# Patient Record
Sex: MALE | Race: BLACK OR AFRICAN AMERICAN | Employment: UNEMPLOYED | URBAN - METROPOLITAN AREA
[De-identification: names, ages, dates, MRNs, and addresses within clinical notes are randomized per-mention and may not be internally consistent; named-entity substitution may affect disease eponyms.]

---

## 2018-09-20 ENCOUNTER — TELEPHONE (OUTPATIENT)
Dept: INTERNAL MEDICINE | Age: 58
End: 2018-09-20

## 2019-12-06 ENCOUNTER — APPOINTMENT (OUTPATIENT)
Dept: GENERAL RADIOLOGY | Age: 59
DRG: 201 | End: 2019-12-06
Payer: COMMERCIAL

## 2019-12-06 ENCOUNTER — HOSPITAL ENCOUNTER (INPATIENT)
Age: 59
LOS: 5 days | Discharge: OTHER INSTITUTION | DRG: 201 | End: 2019-12-11
Attending: EMERGENCY MEDICINE | Admitting: INTERNAL MEDICINE
Payer: COMMERCIAL

## 2019-12-06 DIAGNOSIS — I48.91 ATRIAL FIBRILLATION WITH RVR (HCC): Primary | ICD-10-CM

## 2019-12-06 LAB
ALBUMIN SERPL-MCNC: 4.3 G/DL (ref 3.5–5.2)
ALP BLD-CCNC: 66 U/L (ref 40–129)
ALT SERPL-CCNC: 18 U/L (ref 0–40)
ANION GAP SERPL CALCULATED.3IONS-SCNC: 11 MMOL/L (ref 7–16)
AST SERPL-CCNC: 22 U/L (ref 0–39)
BASOPHILS ABSOLUTE: 0.02 E9/L (ref 0–0.2)
BASOPHILS RELATIVE PERCENT: 0.2 % (ref 0–2)
BILIRUB SERPL-MCNC: 1.4 MG/DL (ref 0–1.2)
BUN BLDV-MCNC: 13 MG/DL (ref 8–23)
CALCIUM SERPL-MCNC: 9.6 MG/DL (ref 8.6–10.2)
CHLORIDE BLD-SCNC: 104 MMOL/L (ref 98–107)
CO2: 29 MMOL/L (ref 22–29)
CREAT SERPL-MCNC: 1.1 MG/DL (ref 0.7–1.2)
EOSINOPHILS ABSOLUTE: 0.07 E9/L (ref 0.05–0.5)
EOSINOPHILS RELATIVE PERCENT: 0.8 % (ref 0–6)
GFR AFRICAN AMERICAN: >60
GFR NON-AFRICAN AMERICAN: >60 ML/MIN/1.73
GLUCOSE BLD-MCNC: 101 MG/DL (ref 74–99)
HCT VFR BLD CALC: 52.1 % (ref 37–54)
HEMOGLOBIN: 16.6 G/DL (ref 12.5–16.5)
IMMATURE GRANULOCYTES #: 0.03 E9/L
IMMATURE GRANULOCYTES %: 0.4 % (ref 0–5)
LYMPHOCYTES ABSOLUTE: 1.71 E9/L (ref 1.5–4)
LYMPHOCYTES RELATIVE PERCENT: 20.5 % (ref 20–42)
MAGNESIUM: 1.7 MG/DL (ref 1.6–2.6)
MCH RBC QN AUTO: 29.9 PG (ref 26–35)
MCHC RBC AUTO-ENTMCNC: 31.9 % (ref 32–34.5)
MCV RBC AUTO: 93.7 FL (ref 80–99.9)
MONOCYTES ABSOLUTE: 0.7 E9/L (ref 0.1–0.95)
MONOCYTES RELATIVE PERCENT: 8.4 % (ref 2–12)
NEUTROPHILS ABSOLUTE: 5.8 E9/L (ref 1.8–7.3)
NEUTROPHILS RELATIVE PERCENT: 69.7 % (ref 43–80)
PDW BLD-RTO: 13.7 FL (ref 11.5–15)
PLATELET # BLD: 170 E9/L (ref 130–450)
PMV BLD AUTO: 10.9 FL (ref 7–12)
POTASSIUM SERPL-SCNC: 4.5 MMOL/L (ref 3.5–5)
PRO-BNP: 987 PG/ML (ref 0–125)
RBC # BLD: 5.56 E12/L (ref 3.8–5.8)
SODIUM BLD-SCNC: 144 MMOL/L (ref 132–146)
TOTAL PROTEIN: 7.8 G/DL (ref 6.4–8.3)
TROPONIN: <0.01 NG/ML (ref 0–0.03)
WBC # BLD: 8.3 E9/L (ref 4.5–11.5)

## 2019-12-06 PROCEDURE — 83880 ASSAY OF NATRIURETIC PEPTIDE: CPT

## 2019-12-06 PROCEDURE — 6370000000 HC RX 637 (ALT 250 FOR IP): Performed by: INTERNAL MEDICINE

## 2019-12-06 PROCEDURE — 6360000002 HC RX W HCPCS: Performed by: INTERNAL MEDICINE

## 2019-12-06 PROCEDURE — 2140000000 HC CCU INTERMEDIATE R&B

## 2019-12-06 PROCEDURE — 93005 ELECTROCARDIOGRAM TRACING: CPT | Performed by: EMERGENCY MEDICINE

## 2019-12-06 PROCEDURE — 80053 COMPREHEN METABOLIC PANEL: CPT

## 2019-12-06 PROCEDURE — 71045 X-RAY EXAM CHEST 1 VIEW: CPT

## 2019-12-06 PROCEDURE — 2500000003 HC RX 250 WO HCPCS: Performed by: INTERNAL MEDICINE

## 2019-12-06 PROCEDURE — 2580000003 HC RX 258: Performed by: INTERNAL MEDICINE

## 2019-12-06 PROCEDURE — 84484 ASSAY OF TROPONIN QUANT: CPT

## 2019-12-06 PROCEDURE — 2500000003 HC RX 250 WO HCPCS: Performed by: EMERGENCY MEDICINE

## 2019-12-06 PROCEDURE — 96374 THER/PROPH/DIAG INJ IV PUSH: CPT

## 2019-12-06 PROCEDURE — 85025 COMPLETE CBC W/AUTO DIFF WBC: CPT

## 2019-12-06 PROCEDURE — 83735 ASSAY OF MAGNESIUM: CPT

## 2019-12-06 PROCEDURE — 36415 COLL VENOUS BLD VENIPUNCTURE: CPT

## 2019-12-06 PROCEDURE — 99285 EMERGENCY DEPT VISIT HI MDM: CPT

## 2019-12-06 RX ORDER — METOPROLOL TARTRATE 5 MG/5ML
5 INJECTION INTRAVENOUS ONCE
Status: COMPLETED | OUTPATIENT
Start: 2019-12-06 | End: 2019-12-06

## 2019-12-06 RX ORDER — SODIUM CHLORIDE 0.9 % (FLUSH) 0.9 %
10 SYRINGE (ML) INJECTION PRN
Status: DISCONTINUED | OUTPATIENT
Start: 2019-12-06 | End: 2019-12-11 | Stop reason: HOSPADM

## 2019-12-06 RX ORDER — ACETAMINOPHEN 325 MG/1
650 TABLET ORAL EVERY 4 HOURS PRN
Status: DISCONTINUED | OUTPATIENT
Start: 2019-12-06 | End: 2019-12-11 | Stop reason: HOSPADM

## 2019-12-06 RX ORDER — PALIPERIDONE 6 MG/1
6 TABLET, EXTENDED RELEASE ORAL EVERY MORNING
COMMUNITY

## 2019-12-06 RX ORDER — QUETIAPINE FUMARATE 50 MG/1
50 TABLET, FILM COATED ORAL NIGHTLY
COMMUNITY

## 2019-12-06 RX ORDER — DILTIAZEM HYDROCHLORIDE 5 MG/ML
25 INJECTION INTRAVENOUS ONCE
Status: COMPLETED | OUTPATIENT
Start: 2019-12-06 | End: 2019-12-06

## 2019-12-06 RX ORDER — SODIUM CHLORIDE 0.9 % (FLUSH) 0.9 %
10 SYRINGE (ML) INJECTION EVERY 12 HOURS SCHEDULED
Status: DISCONTINUED | OUTPATIENT
Start: 2019-12-06 | End: 2019-12-11 | Stop reason: HOSPADM

## 2019-12-06 RX ORDER — MELATONIN
1000 DAILY
COMMUNITY
End: 2020-09-19

## 2019-12-06 RX ORDER — MAGNESIUM SULFATE IN WATER 40 MG/ML
2 INJECTION, SOLUTION INTRAVENOUS ONCE
Status: COMPLETED | OUTPATIENT
Start: 2019-12-06 | End: 2019-12-06

## 2019-12-06 RX ORDER — M-VIT,TX,IRON,MINS/CALC/FOLIC 27MG-0.4MG
1 TABLET ORAL DAILY
COMMUNITY
End: 2020-09-19

## 2019-12-06 RX ORDER — DILTIAZEM HYDROCHLORIDE 5 MG/ML
20 INJECTION INTRAVENOUS ONCE
Status: COMPLETED | OUTPATIENT
Start: 2019-12-06 | End: 2019-12-06

## 2019-12-06 RX ORDER — POLYETHYLENE GLYCOL 3350 17 G/17G
17 POWDER, FOR SOLUTION ORAL DAILY PRN
Status: DISCONTINUED | OUTPATIENT
Start: 2019-12-06 | End: 2019-12-11 | Stop reason: HOSPADM

## 2019-12-06 RX ORDER — METOPROLOL TARTRATE 50 MG/1
100 TABLET, FILM COATED ORAL 2 TIMES DAILY
Status: DISCONTINUED | OUTPATIENT
Start: 2019-12-06 | End: 2019-12-10

## 2019-12-06 RX ADMIN — METOPROLOL TARTRATE 5 MG: 5 INJECTION INTRAVENOUS at 16:50

## 2019-12-06 RX ADMIN — METOPROLOL TARTRATE 5 MG: 5 INJECTION INTRAVENOUS at 17:11

## 2019-12-06 RX ADMIN — DILTIAZEM HYDROCHLORIDE 25 MG: 5 INJECTION INTRAVENOUS at 21:42

## 2019-12-06 RX ADMIN — MAGNESIUM SULFATE HEPTAHYDRATE 2 G: 40 INJECTION, SOLUTION INTRAVENOUS at 20:26

## 2019-12-06 RX ADMIN — Medication 10 ML: at 20:26

## 2019-12-06 RX ADMIN — METOPROLOL TARTRATE 100 MG: 50 TABLET, FILM COATED ORAL at 21:43

## 2019-12-06 RX ADMIN — DILTIAZEM HYDROCHLORIDE 20 MG: 5 INJECTION INTRAVENOUS at 20:31

## 2019-12-06 ASSESSMENT — ENCOUNTER SYMPTOMS
BACK PAIN: 0
VOMITING: 0
NAUSEA: 0
CHEST TIGHTNESS: 0
COUGH: 0
SINUS PRESSURE: 0
EYE DISCHARGE: 0
DIARRHEA: 0
SORE THROAT: 0
WHEEZING: 0
SHORTNESS OF BREATH: 0
ABDOMINAL PAIN: 0
EYE PAIN: 0
EYE REDNESS: 0

## 2019-12-06 ASSESSMENT — PAIN SCALES - GENERAL
PAINLEVEL_OUTOF10: 0
PAINLEVEL_OUTOF10: 0

## 2019-12-07 LAB
AMPHETAMINE SCREEN, URINE: NOT DETECTED
ANION GAP SERPL CALCULATED.3IONS-SCNC: 14 MMOL/L (ref 7–16)
BARBITURATE SCREEN URINE: NOT DETECTED
BASOPHILS ABSOLUTE: 0.04 E9/L (ref 0–0.2)
BASOPHILS RELATIVE PERCENT: 0.5 % (ref 0–2)
BENZODIAZEPINE SCREEN, URINE: NOT DETECTED
BILIRUBIN DIRECT: 0.2 MG/DL (ref 0–0.3)
BUN BLDV-MCNC: 14 MG/DL (ref 8–23)
CALCIUM SERPL-MCNC: 9.1 MG/DL (ref 8.6–10.2)
CANNABINOID SCREEN URINE: NOT DETECTED
CHLORIDE BLD-SCNC: 103 MMOL/L (ref 98–107)
CO2: 22 MMOL/L (ref 22–29)
COCAINE METABOLITE SCREEN URINE: NOT DETECTED
CREAT SERPL-MCNC: 1 MG/DL (ref 0.7–1.2)
EKG ATRIAL RATE: 264 BPM
EKG ATRIAL RATE: 280 BPM
EKG P AXIS: -87 DEGREES
EKG P AXIS: -89 DEGREES
EKG Q-T INTERVAL: 350 MS
EKG Q-T INTERVAL: 382 MS
EKG QRS DURATION: 78 MS
EKG QRS DURATION: 80 MS
EKG QTC CALCULATION (BAZETT): 534 MS
EKG QTC CALCULATION (BAZETT): 565 MS
EKG R AXIS: -4 DEGREES
EKG R AXIS: 59 DEGREES
EKG T AXIS: -66 DEGREES
EKG T AXIS: -66 DEGREES
EKG VENTRICULAR RATE: 132 BPM
EKG VENTRICULAR RATE: 140 BPM
EOSINOPHILS ABSOLUTE: 0.16 E9/L (ref 0.05–0.5)
EOSINOPHILS RELATIVE PERCENT: 2 % (ref 0–6)
FENTANYL SCREEN, URINE: NOT DETECTED
GFR AFRICAN AMERICAN: >60
GFR NON-AFRICAN AMERICAN: >60 ML/MIN/1.73
GLUCOSE BLD-MCNC: 87 MG/DL (ref 74–99)
HCT VFR BLD CALC: 48 % (ref 37–54)
HEMOGLOBIN: 15.4 G/DL (ref 12.5–16.5)
IMMATURE GRANULOCYTES #: 0.03 E9/L
IMMATURE GRANULOCYTES %: 0.4 % (ref 0–5)
LYMPHOCYTES ABSOLUTE: 2.31 E9/L (ref 1.5–4)
LYMPHOCYTES RELATIVE PERCENT: 29.3 % (ref 20–42)
Lab: NORMAL
MAGNESIUM: 2.1 MG/DL (ref 1.6–2.6)
MCH RBC QN AUTO: 30.5 PG (ref 26–35)
MCHC RBC AUTO-ENTMCNC: 32.1 % (ref 32–34.5)
MCV RBC AUTO: 95 FL (ref 80–99.9)
METHADONE SCREEN, URINE: NOT DETECTED
MONOCYTES ABSOLUTE: 0.76 E9/L (ref 0.1–0.95)
MONOCYTES RELATIVE PERCENT: 9.6 % (ref 2–12)
NEUTROPHILS ABSOLUTE: 4.59 E9/L (ref 1.8–7.3)
NEUTROPHILS RELATIVE PERCENT: 58.2 % (ref 43–80)
OPIATE SCREEN URINE: NOT DETECTED
OXYCODONE URINE: NOT DETECTED
PDW BLD-RTO: 13.8 FL (ref 11.5–15)
PHENCYCLIDINE SCREEN URINE: NOT DETECTED
PLATELET # BLD: 164 E9/L (ref 130–450)
PMV BLD AUTO: 11.2 FL (ref 7–12)
POTASSIUM REFLEX MAGNESIUM: 4.4 MMOL/L (ref 3.5–5)
RBC # BLD: 5.05 E12/L (ref 3.8–5.8)
SODIUM BLD-SCNC: 139 MMOL/L (ref 132–146)
T4 FREE: 1.69 NG/DL (ref 0.93–1.7)
TSH SERPL DL<=0.05 MIU/L-ACNC: 1.76 UIU/ML (ref 0.27–4.2)
WBC # BLD: 7.9 E9/L (ref 4.5–11.5)

## 2019-12-07 PROCEDURE — APPSS60 APP SPLIT SHARED TIME 46-60 MINUTES: Performed by: NURSE PRACTITIONER

## 2019-12-07 PROCEDURE — 80307 DRUG TEST PRSMV CHEM ANLYZR: CPT

## 2019-12-07 PROCEDURE — 83735 ASSAY OF MAGNESIUM: CPT

## 2019-12-07 PROCEDURE — 2580000003 HC RX 258: Performed by: INTERNAL MEDICINE

## 2019-12-07 PROCEDURE — 80048 BASIC METABOLIC PNL TOTAL CA: CPT

## 2019-12-07 PROCEDURE — 2500000003 HC RX 250 WO HCPCS: Performed by: INTERNAL MEDICINE

## 2019-12-07 PROCEDURE — 82248 BILIRUBIN DIRECT: CPT

## 2019-12-07 PROCEDURE — 84443 ASSAY THYROID STIM HORMONE: CPT

## 2019-12-07 PROCEDURE — 36415 COLL VENOUS BLD VENIPUNCTURE: CPT

## 2019-12-07 PROCEDURE — 99222 1ST HOSP IP/OBS MODERATE 55: CPT | Performed by: INTERNAL MEDICINE

## 2019-12-07 PROCEDURE — 85025 COMPLETE CBC W/AUTO DIFF WBC: CPT

## 2019-12-07 PROCEDURE — 84439 ASSAY OF FREE THYROXINE: CPT

## 2019-12-07 PROCEDURE — 93010 ELECTROCARDIOGRAM REPORT: CPT | Performed by: INTERNAL MEDICINE

## 2019-12-07 PROCEDURE — 6360000002 HC RX W HCPCS: Performed by: INTERNAL MEDICINE

## 2019-12-07 PROCEDURE — 93005 ELECTROCARDIOGRAM TRACING: CPT | Performed by: INTERNAL MEDICINE

## 2019-12-07 PROCEDURE — 6360000002 HC RX W HCPCS: Performed by: NURSE PRACTITIONER

## 2019-12-07 PROCEDURE — 99253 IP/OBS CNSLTJ NEW/EST LOW 45: CPT | Performed by: NURSE PRACTITIONER

## 2019-12-07 PROCEDURE — 2140000000 HC CCU INTERMEDIATE R&B

## 2019-12-07 PROCEDURE — 6370000000 HC RX 637 (ALT 250 FOR IP): Performed by: INTERNAL MEDICINE

## 2019-12-07 RX ORDER — 0.9 % SODIUM CHLORIDE 0.9 %
500 INTRAVENOUS SOLUTION INTRAVENOUS ONCE
Status: COMPLETED | OUTPATIENT
Start: 2019-12-07 | End: 2019-12-07

## 2019-12-07 RX ORDER — 0.9 % SODIUM CHLORIDE 0.9 %
1000 INTRAVENOUS SOLUTION INTRAVENOUS ONCE
Status: DISCONTINUED | OUTPATIENT
Start: 2019-12-07 | End: 2019-12-07

## 2019-12-07 RX ORDER — DIGOXIN 0.25 MG/ML
500 INJECTION INTRAMUSCULAR; INTRAVENOUS ONCE
Status: COMPLETED | OUTPATIENT
Start: 2019-12-07 | End: 2019-12-07

## 2019-12-07 RX ORDER — DIGOXIN 0.25 MG/ML
250 INJECTION INTRAMUSCULAR; INTRAVENOUS ONCE
Status: COMPLETED | OUTPATIENT
Start: 2019-12-07 | End: 2019-12-07

## 2019-12-07 RX ADMIN — Medication 10 ML: at 09:23

## 2019-12-07 RX ADMIN — DEXTROSE MONOHYDRATE 5 MG/HR: 50 INJECTION, SOLUTION INTRAVENOUS at 06:30

## 2019-12-07 RX ADMIN — SODIUM CHLORIDE 500 ML: 9 INJECTION, SOLUTION INTRAVENOUS at 13:46

## 2019-12-07 RX ADMIN — DEXTROSE MONOHYDRATE 5 MG/HR: 50 INJECTION, SOLUTION INTRAVENOUS at 21:19

## 2019-12-07 RX ADMIN — SODIUM CHLORIDE, PRESERVATIVE FREE 10 ML: 5 INJECTION INTRAVENOUS at 06:31

## 2019-12-07 RX ADMIN — SODIUM CHLORIDE, PRESERVATIVE FREE 10 ML: 5 INJECTION INTRAVENOUS at 09:18

## 2019-12-07 RX ADMIN — METOPROLOL TARTRATE 100 MG: 50 TABLET, FILM COATED ORAL at 20:51

## 2019-12-07 RX ADMIN — ENOXAPARIN SODIUM 40 MG: 40 INJECTION, SOLUTION INTRAVENOUS; SUBCUTANEOUS at 08:37

## 2019-12-07 RX ADMIN — METOPROLOL TARTRATE 100 MG: 50 TABLET, FILM COATED ORAL at 08:37

## 2019-12-07 RX ADMIN — DIGOXIN 250 MCG: 0.25 INJECTION INTRAMUSCULAR; INTRAVENOUS at 09:18

## 2019-12-07 RX ADMIN — DIGOXIN 500 MCG: 0.25 INJECTION INTRAMUSCULAR; INTRAVENOUS at 15:15

## 2019-12-07 ASSESSMENT — PAIN SCALES - GENERAL
PAINLEVEL_OUTOF10: 0

## 2019-12-08 ENCOUNTER — APPOINTMENT (OUTPATIENT)
Dept: GENERAL RADIOLOGY | Age: 59
DRG: 201 | End: 2019-12-08
Payer: COMMERCIAL

## 2019-12-08 LAB
ANION GAP SERPL CALCULATED.3IONS-SCNC: 13 MMOL/L (ref 7–16)
BUN BLDV-MCNC: 17 MG/DL (ref 8–23)
CALCIUM SERPL-MCNC: 9.6 MG/DL (ref 8.6–10.2)
CHLORIDE BLD-SCNC: 108 MMOL/L (ref 98–107)
CO2: 22 MMOL/L (ref 22–29)
CREAT SERPL-MCNC: 1.1 MG/DL (ref 0.7–1.2)
GFR AFRICAN AMERICAN: >60
GFR NON-AFRICAN AMERICAN: >60 ML/MIN/1.73
GLUCOSE BLD-MCNC: 80 MG/DL (ref 74–99)
MAGNESIUM: 1.9 MG/DL (ref 1.6–2.6)
POTASSIUM SERPL-SCNC: 4.6 MMOL/L (ref 3.5–5)
SODIUM BLD-SCNC: 143 MMOL/L (ref 132–146)

## 2019-12-08 PROCEDURE — 93005 ELECTROCARDIOGRAM TRACING: CPT | Performed by: INTERNAL MEDICINE

## 2019-12-08 PROCEDURE — 6360000002 HC RX W HCPCS: Performed by: INTERNAL MEDICINE

## 2019-12-08 PROCEDURE — 2580000003 HC RX 258: Performed by: INTERNAL MEDICINE

## 2019-12-08 PROCEDURE — 2140000000 HC CCU INTERMEDIATE R&B

## 2019-12-08 PROCEDURE — 80048 BASIC METABOLIC PNL TOTAL CA: CPT

## 2019-12-08 PROCEDURE — 6370000000 HC RX 637 (ALT 250 FOR IP): Performed by: INTERNAL MEDICINE

## 2019-12-08 PROCEDURE — 99232 SBSQ HOSP IP/OBS MODERATE 35: CPT | Performed by: INTERNAL MEDICINE

## 2019-12-08 PROCEDURE — 36415 COLL VENOUS BLD VENIPUNCTURE: CPT

## 2019-12-08 PROCEDURE — 83735 ASSAY OF MAGNESIUM: CPT

## 2019-12-08 RX ORDER — MAGNESIUM SULFATE IN WATER 40 MG/ML
2 INJECTION, SOLUTION INTRAVENOUS ONCE
Status: COMPLETED | OUTPATIENT
Start: 2019-12-08 | End: 2019-12-08

## 2019-12-08 RX ORDER — PALIPERIDONE 6 MG/1
6 TABLET, EXTENDED RELEASE ORAL DAILY
Status: DISCONTINUED | OUTPATIENT
Start: 2019-12-08 | End: 2019-12-11 | Stop reason: HOSPADM

## 2019-12-08 RX ADMIN — MAGNESIUM SULFATE HEPTAHYDRATE 2 G: 40 INJECTION, SOLUTION INTRAVENOUS at 16:47

## 2019-12-08 RX ADMIN — Medication 10 ML: at 22:10

## 2019-12-08 RX ADMIN — METOPROLOL TARTRATE 100 MG: 50 TABLET, FILM COATED ORAL at 11:11

## 2019-12-08 RX ADMIN — METOPROLOL TARTRATE 100 MG: 50 TABLET, FILM COATED ORAL at 22:09

## 2019-12-08 RX ADMIN — ENOXAPARIN SODIUM 90 MG: 100 INJECTION SUBCUTANEOUS at 11:12

## 2019-12-08 RX ADMIN — Medication 10 ML: at 11:12

## 2019-12-08 RX ADMIN — ENOXAPARIN SODIUM 90 MG: 100 INJECTION SUBCUTANEOUS at 22:08

## 2019-12-08 RX ADMIN — PALIPERIDONE 6 MG: 6 TABLET, EXTENDED RELEASE ORAL at 11:12

## 2019-12-08 ASSESSMENT — PAIN SCALES - GENERAL
PAINLEVEL_OUTOF10: 0

## 2019-12-09 LAB
ANION GAP SERPL CALCULATED.3IONS-SCNC: 16 MMOL/L (ref 7–16)
BASOPHILS ABSOLUTE: 0.04 E9/L (ref 0–0.2)
BASOPHILS RELATIVE PERCENT: 0.5 % (ref 0–2)
BUN BLDV-MCNC: 16 MG/DL (ref 8–23)
CALCIUM SERPL-MCNC: 9.6 MG/DL (ref 8.6–10.2)
CHLORIDE BLD-SCNC: 100 MMOL/L (ref 98–107)
CO2: 23 MMOL/L (ref 22–29)
CREAT SERPL-MCNC: 1 MG/DL (ref 0.7–1.2)
EKG ATRIAL RATE: 272 BPM
EKG ATRIAL RATE: 278 BPM
EKG P AXIS: -88 DEGREES
EKG P AXIS: -94 DEGREES
EKG Q-T INTERVAL: 356 MS
EKG Q-T INTERVAL: 374 MS
EKG QRS DURATION: 80 MS
EKG QRS DURATION: 80 MS
EKG QTC CALCULATION (BAZETT): 541 MS
EKG QTC CALCULATION (BAZETT): 562 MS
EKG R AXIS: 34 DEGREES
EKG R AXIS: 82 DEGREES
EKG T AXIS: -66 DEGREES
EKG T AXIS: -81 DEGREES
EKG VENTRICULAR RATE: 136 BPM
EKG VENTRICULAR RATE: 139 BPM
EOSINOPHILS ABSOLUTE: 0.14 E9/L (ref 0.05–0.5)
EOSINOPHILS RELATIVE PERCENT: 1.6 % (ref 0–6)
GFR AFRICAN AMERICAN: >60
GFR NON-AFRICAN AMERICAN: >60 ML/MIN/1.73
GLUCOSE BLD-MCNC: 87 MG/DL (ref 74–99)
HCT VFR BLD CALC: 50.6 % (ref 37–54)
HEMOGLOBIN: 16.2 G/DL (ref 12.5–16.5)
IMMATURE GRANULOCYTES #: 0.02 E9/L
IMMATURE GRANULOCYTES %: 0.2 % (ref 0–5)
LYMPHOCYTES ABSOLUTE: 2.45 E9/L (ref 1.5–4)
LYMPHOCYTES RELATIVE PERCENT: 28 % (ref 20–42)
MAGNESIUM: 1.9 MG/DL (ref 1.6–2.6)
MCH RBC QN AUTO: 30.3 PG (ref 26–35)
MCHC RBC AUTO-ENTMCNC: 32 % (ref 32–34.5)
MCV RBC AUTO: 94.6 FL (ref 80–99.9)
METER GLUCOSE: 103 MG/DL (ref 74–99)
METER GLUCOSE: 134 MG/DL (ref 74–99)
MONOCYTES ABSOLUTE: 0.84 E9/L (ref 0.1–0.95)
MONOCYTES RELATIVE PERCENT: 9.6 % (ref 2–12)
NEUTROPHILS ABSOLUTE: 5.26 E9/L (ref 1.8–7.3)
NEUTROPHILS RELATIVE PERCENT: 60.1 % (ref 43–80)
PDW BLD-RTO: 13.3 FL (ref 11.5–15)
PLATELET # BLD: 163 E9/L (ref 130–450)
PMV BLD AUTO: 11.2 FL (ref 7–12)
POTASSIUM SERPL-SCNC: 4.6 MMOL/L (ref 3.5–5)
RBC # BLD: 5.35 E12/L (ref 3.8–5.8)
SODIUM BLD-SCNC: 139 MMOL/L (ref 132–146)
WBC # BLD: 8.8 E9/L (ref 4.5–11.5)

## 2019-12-09 PROCEDURE — 99233 SBSQ HOSP IP/OBS HIGH 50: CPT | Performed by: INTERNAL MEDICINE

## 2019-12-09 PROCEDURE — 6370000000 HC RX 637 (ALT 250 FOR IP): Performed by: INTERNAL MEDICINE

## 2019-12-09 PROCEDURE — 80048 BASIC METABOLIC PNL TOTAL CA: CPT

## 2019-12-09 PROCEDURE — 2140000000 HC CCU INTERMEDIATE R&B

## 2019-12-09 PROCEDURE — 2580000003 HC RX 258: Performed by: INTERNAL MEDICINE

## 2019-12-09 PROCEDURE — 93010 ELECTROCARDIOGRAM REPORT: CPT | Performed by: INTERNAL MEDICINE

## 2019-12-09 PROCEDURE — 99232 SBSQ HOSP IP/OBS MODERATE 35: CPT | Performed by: INTERNAL MEDICINE

## 2019-12-09 PROCEDURE — 93005 ELECTROCARDIOGRAM TRACING: CPT | Performed by: INTERNAL MEDICINE

## 2019-12-09 PROCEDURE — 82962 GLUCOSE BLOOD TEST: CPT

## 2019-12-09 PROCEDURE — 36415 COLL VENOUS BLD VENIPUNCTURE: CPT

## 2019-12-09 PROCEDURE — 83735 ASSAY OF MAGNESIUM: CPT

## 2019-12-09 PROCEDURE — 85025 COMPLETE CBC W/AUTO DIFF WBC: CPT

## 2019-12-09 PROCEDURE — 6360000002 HC RX W HCPCS: Performed by: INTERNAL MEDICINE

## 2019-12-09 RX ORDER — MAGNESIUM SULFATE IN WATER 40 MG/ML
2 INJECTION, SOLUTION INTRAVENOUS ONCE
Status: COMPLETED | OUTPATIENT
Start: 2019-12-09 | End: 2019-12-09

## 2019-12-09 RX ADMIN — RIVAROXABAN 20 MG: 20 TABLET, FILM COATED ORAL at 18:13

## 2019-12-09 RX ADMIN — METOPROLOL TARTRATE 100 MG: 50 TABLET, FILM COATED ORAL at 21:29

## 2019-12-09 RX ADMIN — Medication 10 ML: at 10:03

## 2019-12-09 RX ADMIN — Medication 10 ML: at 21:30

## 2019-12-09 RX ADMIN — MAGNESIUM SULFATE HEPTAHYDRATE 2 G: 40 INJECTION, SOLUTION INTRAVENOUS at 10:03

## 2019-12-09 RX ADMIN — METOPROLOL TARTRATE 100 MG: 50 TABLET, FILM COATED ORAL at 10:03

## 2019-12-09 RX ADMIN — PALIPERIDONE 6 MG: 6 TABLET, EXTENDED RELEASE ORAL at 10:03

## 2019-12-09 ASSESSMENT — PAIN SCALES - GENERAL
PAINLEVEL_OUTOF10: 0

## 2019-12-10 ENCOUNTER — ANESTHESIA (OUTPATIENT)
Dept: CARDIAC CATH/INVASIVE PROCEDURES | Age: 59
DRG: 201 | End: 2019-12-10
Payer: COMMERCIAL

## 2019-12-10 ENCOUNTER — ANESTHESIA EVENT (OUTPATIENT)
Dept: CARDIAC CATH/INVASIVE PROCEDURES | Age: 59
DRG: 201 | End: 2019-12-10
Payer: COMMERCIAL

## 2019-12-10 ENCOUNTER — APPOINTMENT (OUTPATIENT)
Dept: CARDIAC CATH/INVASIVE PROCEDURES | Age: 59
End: 2019-12-10
Payer: COMMERCIAL

## 2019-12-10 VITALS
RESPIRATION RATE: 29 BRPM | DIASTOLIC BLOOD PRESSURE: 82 MMHG | SYSTOLIC BLOOD PRESSURE: 114 MMHG | OXYGEN SATURATION: 95 %

## 2019-12-10 LAB
ANION GAP SERPL CALCULATED.3IONS-SCNC: 14 MMOL/L (ref 7–16)
BUN BLDV-MCNC: 17 MG/DL (ref 8–23)
CALCIUM SERPL-MCNC: 9.5 MG/DL (ref 8.6–10.2)
CHLORIDE BLD-SCNC: 104 MMOL/L (ref 98–107)
CO2: 22 MMOL/L (ref 22–29)
CREAT SERPL-MCNC: 1 MG/DL (ref 0.7–1.2)
EKG ATRIAL RATE: 71 BPM
EKG P AXIS: 72 DEGREES
EKG P-R INTERVAL: 140 MS
EKG Q-T INTERVAL: 384 MS
EKG QRS DURATION: 84 MS
EKG QTC CALCULATION (BAZETT): 417 MS
EKG R AXIS: 53 DEGREES
EKG T AXIS: 57 DEGREES
EKG VENTRICULAR RATE: 71 BPM
GFR AFRICAN AMERICAN: >60
GFR NON-AFRICAN AMERICAN: >60 ML/MIN/1.73
GLUCOSE BLD-MCNC: 89 MG/DL (ref 74–99)
LV EF: 23 %
LVEF MODALITY: NORMAL
MAGNESIUM: 1.8 MG/DL (ref 1.6–2.6)
METER GLUCOSE: 102 MG/DL (ref 74–99)
METER GLUCOSE: 111 MG/DL (ref 74–99)
POTASSIUM SERPL-SCNC: 4.5 MMOL/L (ref 3.5–5)
SODIUM BLD-SCNC: 140 MMOL/L (ref 132–146)

## 2019-12-10 PROCEDURE — 2709999900 HC NON-CHARGEABLE SUPPLY

## 2019-12-10 PROCEDURE — 99233 SBSQ HOSP IP/OBS HIGH 50: CPT | Performed by: INTERNAL MEDICINE

## 2019-12-10 PROCEDURE — 36415 COLL VENOUS BLD VENIPUNCTURE: CPT

## 2019-12-10 PROCEDURE — 93005 ELECTROCARDIOGRAM TRACING: CPT | Performed by: INTERNAL MEDICINE

## 2019-12-10 PROCEDURE — 92960 CARDIOVERSION ELECTRIC EXT: CPT | Performed by: INTERNAL MEDICINE

## 2019-12-10 PROCEDURE — 93010 ELECTROCARDIOGRAM REPORT: CPT | Performed by: INTERNAL MEDICINE

## 2019-12-10 PROCEDURE — 6360000002 HC RX W HCPCS: Performed by: INTERNAL MEDICINE

## 2019-12-10 PROCEDURE — 6360000002 HC RX W HCPCS: Performed by: NURSE ANESTHETIST, CERTIFIED REGISTERED

## 2019-12-10 PROCEDURE — 2140000000 HC CCU INTERMEDIATE R&B

## 2019-12-10 PROCEDURE — 2580000003 HC RX 258: Performed by: NURSE ANESTHETIST, CERTIFIED REGISTERED

## 2019-12-10 PROCEDURE — 3700000001 HC ADD 15 MINUTES (ANESTHESIA)

## 2019-12-10 PROCEDURE — B24BZZ4 ULTRASONOGRAPHY OF HEART WITH AORTA, TRANSESOPHAGEAL: ICD-10-PCS | Performed by: INTERNAL MEDICINE

## 2019-12-10 PROCEDURE — 80048 BASIC METABOLIC PNL TOTAL CA: CPT

## 2019-12-10 PROCEDURE — 2580000003 HC RX 258: Performed by: INTERNAL MEDICINE

## 2019-12-10 PROCEDURE — 93312 ECHO TRANSESOPHAGEAL: CPT

## 2019-12-10 PROCEDURE — 5A2204Z RESTORATION OF CARDIAC RHYTHM, SINGLE: ICD-10-PCS | Performed by: INTERNAL MEDICINE

## 2019-12-10 PROCEDURE — 99232 SBSQ HOSP IP/OBS MODERATE 35: CPT | Performed by: INTERNAL MEDICINE

## 2019-12-10 PROCEDURE — 6370000000 HC RX 637 (ALT 250 FOR IP): Performed by: INTERNAL MEDICINE

## 2019-12-10 PROCEDURE — 3700000000 HC ANESTHESIA ATTENDED CARE

## 2019-12-10 PROCEDURE — 93325 DOPPLER ECHO COLOR FLOW MAPG: CPT

## 2019-12-10 PROCEDURE — 82962 GLUCOSE BLOOD TEST: CPT

## 2019-12-10 PROCEDURE — 83735 ASSAY OF MAGNESIUM: CPT

## 2019-12-10 PROCEDURE — 93321 DOPPLER ECHO F-UP/LMTD STD: CPT

## 2019-12-10 RX ORDER — METOPROLOL SUCCINATE 50 MG/1
50 TABLET, EXTENDED RELEASE ORAL 2 TIMES DAILY
Status: DISCONTINUED | OUTPATIENT
Start: 2019-12-10 | End: 2019-12-11 | Stop reason: HOSPADM

## 2019-12-10 RX ORDER — PROPOFOL 10 MG/ML
INJECTION, EMULSION INTRAVENOUS PRN
Status: DISCONTINUED | OUTPATIENT
Start: 2019-12-10 | End: 2019-12-10 | Stop reason: SDUPTHER

## 2019-12-10 RX ORDER — SODIUM CHLORIDE 9 MG/ML
INJECTION, SOLUTION INTRAVENOUS CONTINUOUS
Status: DISCONTINUED | OUTPATIENT
Start: 2019-12-10 | End: 2019-12-10

## 2019-12-10 RX ORDER — MAGNESIUM SULFATE IN WATER 40 MG/ML
2 INJECTION, SOLUTION INTRAVENOUS ONCE
Status: COMPLETED | OUTPATIENT
Start: 2019-12-10 | End: 2019-12-10

## 2019-12-10 RX ORDER — SODIUM CHLORIDE 9 MG/ML
INJECTION, SOLUTION INTRAVENOUS CONTINUOUS PRN
Status: DISCONTINUED | OUTPATIENT
Start: 2019-12-10 | End: 2019-12-10 | Stop reason: SDUPTHER

## 2019-12-10 RX ADMIN — Medication 10 ML: at 10:34

## 2019-12-10 RX ADMIN — SODIUM CHLORIDE: 9 INJECTION, SOLUTION INTRAVENOUS at 08:34

## 2019-12-10 RX ADMIN — MAGNESIUM SULFATE HEPTAHYDRATE 2 G: 40 INJECTION, SOLUTION INTRAVENOUS at 09:05

## 2019-12-10 RX ADMIN — METOPROLOL SUCCINATE 50 MG: 50 TABLET, EXTENDED RELEASE ORAL at 10:35

## 2019-12-10 RX ADMIN — METOPROLOL SUCCINATE 50 MG: 50 TABLET, EXTENDED RELEASE ORAL at 21:03

## 2019-12-10 RX ADMIN — PROPOFOL 180 MG: 10 INJECTION, EMULSION INTRAVENOUS at 08:37

## 2019-12-10 RX ADMIN — RIVAROXABAN 20 MG: 20 TABLET, FILM COATED ORAL at 18:57

## 2019-12-10 RX ADMIN — PALIPERIDONE 6 MG: 6 TABLET, EXTENDED RELEASE ORAL at 10:34

## 2019-12-10 RX ADMIN — Medication 10 ML: at 21:04

## 2019-12-10 ASSESSMENT — PAIN SCALES - GENERAL
PAINLEVEL_OUTOF10: 0

## 2019-12-11 VITALS
HEART RATE: 65 BPM | HEIGHT: 73 IN | DIASTOLIC BLOOD PRESSURE: 62 MMHG | WEIGHT: 190.4 LBS | BODY MASS INDEX: 25.23 KG/M2 | OXYGEN SATURATION: 98 % | RESPIRATION RATE: 18 BRPM | TEMPERATURE: 98.5 F | SYSTOLIC BLOOD PRESSURE: 108 MMHG

## 2019-12-11 LAB
ANION GAP SERPL CALCULATED.3IONS-SCNC: 14 MMOL/L (ref 7–16)
BUN BLDV-MCNC: 25 MG/DL (ref 8–23)
CALCIUM SERPL-MCNC: 9.4 MG/DL (ref 8.6–10.2)
CHLORIDE BLD-SCNC: 101 MMOL/L (ref 98–107)
CO2: 24 MMOL/L (ref 22–29)
CREAT SERPL-MCNC: 1.1 MG/DL (ref 0.7–1.2)
GFR AFRICAN AMERICAN: >60
GFR NON-AFRICAN AMERICAN: >60 ML/MIN/1.73
GLUCOSE BLD-MCNC: 96 MG/DL (ref 74–99)
MAGNESIUM: 1.8 MG/DL (ref 1.6–2.6)
METER GLUCOSE: 79 MG/DL (ref 74–99)
METER GLUCOSE: 93 MG/DL (ref 74–99)
POTASSIUM SERPL-SCNC: 4.5 MMOL/L (ref 3.5–5)
SODIUM BLD-SCNC: 139 MMOL/L (ref 132–146)

## 2019-12-11 PROCEDURE — 80048 BASIC METABOLIC PNL TOTAL CA: CPT

## 2019-12-11 PROCEDURE — 82962 GLUCOSE BLOOD TEST: CPT

## 2019-12-11 PROCEDURE — 6360000002 HC RX W HCPCS: Performed by: INTERNAL MEDICINE

## 2019-12-11 PROCEDURE — 2580000003 HC RX 258: Performed by: INTERNAL MEDICINE

## 2019-12-11 PROCEDURE — 36415 COLL VENOUS BLD VENIPUNCTURE: CPT

## 2019-12-11 PROCEDURE — 6370000000 HC RX 637 (ALT 250 FOR IP): Performed by: INTERNAL MEDICINE

## 2019-12-11 PROCEDURE — 99233 SBSQ HOSP IP/OBS HIGH 50: CPT | Performed by: INTERNAL MEDICINE

## 2019-12-11 PROCEDURE — 83735 ASSAY OF MAGNESIUM: CPT

## 2019-12-11 PROCEDURE — 99232 SBSQ HOSP IP/OBS MODERATE 35: CPT | Performed by: INTERNAL MEDICINE

## 2019-12-11 RX ORDER — MAGNESIUM SULFATE IN WATER 40 MG/ML
2 INJECTION, SOLUTION INTRAVENOUS ONCE
Status: COMPLETED | OUTPATIENT
Start: 2019-12-11 | End: 2019-12-11

## 2019-12-11 RX ORDER — LISINOPRIL 2.5 MG/1
2.5 TABLET ORAL DAILY
Qty: 30 TABLET | Refills: 3 | Status: SHIPPED | OUTPATIENT
Start: 2019-12-12

## 2019-12-11 RX ORDER — METOPROLOL SUCCINATE 50 MG/1
50 TABLET, EXTENDED RELEASE ORAL 2 TIMES DAILY
Qty: 30 TABLET | Refills: 3 | Status: SHIPPED | OUTPATIENT
Start: 2019-12-11 | End: 2020-10-02 | Stop reason: SDUPTHER

## 2019-12-11 RX ORDER — LISINOPRIL 5 MG/1
2.5 TABLET ORAL DAILY
Status: DISCONTINUED | OUTPATIENT
Start: 2019-12-11 | End: 2019-12-11 | Stop reason: HOSPADM

## 2019-12-11 RX ADMIN — MAGNESIUM SULFATE HEPTAHYDRATE 2 G: 40 INJECTION, SOLUTION INTRAVENOUS at 10:32

## 2019-12-11 RX ADMIN — PALIPERIDONE 6 MG: 6 TABLET, EXTENDED RELEASE ORAL at 08:21

## 2019-12-11 RX ADMIN — SODIUM CHLORIDE, PRESERVATIVE FREE 10 ML: 5 INJECTION INTRAVENOUS at 10:33

## 2019-12-11 RX ADMIN — METOPROLOL SUCCINATE 50 MG: 50 TABLET, EXTENDED RELEASE ORAL at 08:20

## 2019-12-11 RX ADMIN — Medication 10 ML: at 08:21

## 2019-12-11 RX ADMIN — LISINOPRIL 2.5 MG: 5 TABLET ORAL at 08:20

## 2019-12-11 ASSESSMENT — PAIN SCALES - GENERAL: PAINLEVEL_OUTOF10: 0

## 2020-01-16 ENCOUNTER — TELEPHONE (OUTPATIENT)
Dept: CARDIOLOGY CLINIC | Age: 60
End: 2020-01-16

## 2020-01-16 NOTE — TELEPHONE ENCOUNTER
Nate Bustos with [address] brought patient into the office today for an old appointment originally requested by Dr Meghann Guerrero. He was scheduled to see you @ 10:00 today as a new patient    The consultation appointment was cancelled by [facility] on 12/11/2019. Gautam Mcdanielas his care giver was not notified that this appointment was cancelled. Patient was hospitalized 12/6/2019 discharged 12/11/2019     Discharge Diagnosis:  1. A flutter with RVR - resolved   2. Tachycardia induced cardiomyopathy   3. Schizophrenia     Dr Sandra Marrufo saw him consult 12/7/2019, he underwent KADEEM/cardioversion by Dr Leslie Silver on 12/10/2020     Does patient need seen in our office in follow up? May I schedule him to see you? Scheduled an appointment. cpa

## 2020-09-19 ENCOUNTER — HOSPITAL ENCOUNTER (INPATIENT)
Age: 60
LOS: 4 days | Discharge: HOME | DRG: 201 | End: 2020-09-23
Attending: EMERGENCY MEDICINE | Admitting: INTERNAL MEDICINE
Payer: COMMERCIAL

## 2020-09-19 ENCOUNTER — APPOINTMENT (OUTPATIENT)
Dept: GENERAL RADIOLOGY | Age: 60
DRG: 201 | End: 2020-09-19
Payer: COMMERCIAL

## 2020-09-19 PROBLEM — I47.1 SVT (SUPRAVENTRICULAR TACHYCARDIA) (HCC): Status: ACTIVE | Noted: 2020-09-19

## 2020-09-19 LAB
ALBUMIN SERPL-MCNC: 4 G/DL (ref 3.5–5.2)
ALP BLD-CCNC: 53 U/L (ref 40–129)
ALT SERPL-CCNC: 18 U/L (ref 0–40)
ANION GAP SERPL CALCULATED.3IONS-SCNC: 14 MMOL/L (ref 7–16)
AST SERPL-CCNC: 22 U/L (ref 0–39)
BASOPHILS ABSOLUTE: 0.02 E9/L (ref 0–0.2)
BASOPHILS RELATIVE PERCENT: 0.3 % (ref 0–2)
BILIRUB SERPL-MCNC: 0.4 MG/DL (ref 0–1.2)
BUN BLDV-MCNC: 11 MG/DL (ref 8–23)
CALCIUM SERPL-MCNC: 9.1 MG/DL (ref 8.6–10.2)
CHLORIDE BLD-SCNC: 105 MMOL/L (ref 98–107)
CO2: 23 MMOL/L (ref 22–29)
CREAT SERPL-MCNC: 1 MG/DL (ref 0.7–1.2)
EOSINOPHILS ABSOLUTE: 0.07 E9/L (ref 0.05–0.5)
EOSINOPHILS RELATIVE PERCENT: 1 % (ref 0–6)
GFR AFRICAN AMERICAN: >60
GFR NON-AFRICAN AMERICAN: >60 ML/MIN/1.73
GLUCOSE BLD-MCNC: 109 MG/DL (ref 74–99)
HCT VFR BLD CALC: 39.9 % (ref 37–54)
HEMOGLOBIN: 12.7 G/DL (ref 12.5–16.5)
IMMATURE GRANULOCYTES #: 0.02 E9/L
IMMATURE GRANULOCYTES %: 0.3 % (ref 0–5)
LYMPHOCYTES ABSOLUTE: 1.24 E9/L (ref 1.5–4)
LYMPHOCYTES RELATIVE PERCENT: 16.9 % (ref 20–42)
MAGNESIUM: 1.8 MG/DL (ref 1.6–2.6)
MCH RBC QN AUTO: 28.9 PG (ref 26–35)
MCHC RBC AUTO-ENTMCNC: 31.8 % (ref 32–34.5)
MCV RBC AUTO: 90.9 FL (ref 80–99.9)
MONOCYTES ABSOLUTE: 0.68 E9/L (ref 0.1–0.95)
MONOCYTES RELATIVE PERCENT: 9.3 % (ref 2–12)
NEUTROPHILS ABSOLUTE: 5.3 E9/L (ref 1.8–7.3)
NEUTROPHILS RELATIVE PERCENT: 72.2 % (ref 43–80)
PDW BLD-RTO: 13 FL (ref 11.5–15)
PLATELET # BLD: 153 E9/L (ref 130–450)
PMV BLD AUTO: 10.3 FL (ref 7–12)
POTASSIUM SERPL-SCNC: 4.5 MMOL/L (ref 3.5–5)
PRO-BNP: 588 PG/ML (ref 0–125)
RBC # BLD: 4.39 E12/L (ref 3.8–5.8)
SODIUM BLD-SCNC: 142 MMOL/L (ref 132–146)
T4 FREE: 1.28 NG/DL (ref 0.93–1.7)
TOTAL PROTEIN: 7.3 G/DL (ref 6.4–8.3)
TROPONIN: <0.01 NG/ML (ref 0–0.03)
TSH SERPL DL<=0.05 MIU/L-ACNC: 1.46 UIU/ML (ref 0.27–4.2)
WBC # BLD: 7.3 E9/L (ref 4.5–11.5)

## 2020-09-19 PROCEDURE — 2140000000 HC CCU INTERMEDIATE R&B

## 2020-09-19 PROCEDURE — 99285 EMERGENCY DEPT VISIT HI MDM: CPT

## 2020-09-19 PROCEDURE — 83880 ASSAY OF NATRIURETIC PEPTIDE: CPT

## 2020-09-19 PROCEDURE — 83735 ASSAY OF MAGNESIUM: CPT

## 2020-09-19 PROCEDURE — 96376 TX/PRO/DX INJ SAME DRUG ADON: CPT

## 2020-09-19 PROCEDURE — 80053 COMPREHEN METABOLIC PANEL: CPT

## 2020-09-19 PROCEDURE — 96367 TX/PROPH/DG ADDL SEQ IV INF: CPT

## 2020-09-19 PROCEDURE — 6370000000 HC RX 637 (ALT 250 FOR IP): Performed by: INTERNAL MEDICINE

## 2020-09-19 PROCEDURE — 96366 THER/PROPH/DIAG IV INF ADDON: CPT

## 2020-09-19 PROCEDURE — 6360000002 HC RX W HCPCS: Performed by: EMERGENCY MEDICINE

## 2020-09-19 PROCEDURE — 96365 THER/PROPH/DIAG IV INF INIT: CPT

## 2020-09-19 PROCEDURE — 85025 COMPLETE CBC W/AUTO DIFF WBC: CPT

## 2020-09-19 PROCEDURE — 84443 ASSAY THYROID STIM HORMONE: CPT

## 2020-09-19 PROCEDURE — 71045 X-RAY EXAM CHEST 1 VIEW: CPT

## 2020-09-19 PROCEDURE — 84484 ASSAY OF TROPONIN QUANT: CPT

## 2020-09-19 PROCEDURE — 93005 ELECTROCARDIOGRAM TRACING: CPT | Performed by: STUDENT IN AN ORGANIZED HEALTH CARE EDUCATION/TRAINING PROGRAM

## 2020-09-19 PROCEDURE — 2500000003 HC RX 250 WO HCPCS: Performed by: STUDENT IN AN ORGANIZED HEALTH CARE EDUCATION/TRAINING PROGRAM

## 2020-09-19 PROCEDURE — 93005 ELECTROCARDIOGRAM TRACING: CPT | Performed by: EMERGENCY MEDICINE

## 2020-09-19 PROCEDURE — 84439 ASSAY OF FREE THYROXINE: CPT

## 2020-09-19 RX ORDER — QUETIAPINE FUMARATE 25 MG/1
50 TABLET, FILM COATED ORAL NIGHTLY
Status: DISCONTINUED | OUTPATIENT
Start: 2020-09-19 | End: 2020-09-23 | Stop reason: HOSPADM

## 2020-09-19 RX ORDER — IBUPROFEN 800 MG/1
800 TABLET ORAL EVERY 8 HOURS PRN
COMMUNITY

## 2020-09-19 RX ORDER — POLYETHYLENE GLYCOL 3350 17 G/17G
17 POWDER, FOR SOLUTION ORAL DAILY PRN
Status: DISCONTINUED | OUTPATIENT
Start: 2020-09-19 | End: 2020-09-23 | Stop reason: HOSPADM

## 2020-09-19 RX ORDER — LIDOCAINE HYDROCHLORIDE 20 MG/ML
INJECTION, SOLUTION INTRAVENOUS DAILY PRN
Status: COMPLETED | OUTPATIENT
Start: 2020-09-19 | End: 2020-09-19

## 2020-09-19 RX ORDER — SODIUM CHLORIDE 0.9 % (FLUSH) 0.9 %
10 SYRINGE (ML) INJECTION EVERY 12 HOURS SCHEDULED
Status: DISCONTINUED | OUTPATIENT
Start: 2020-09-19 | End: 2020-09-23 | Stop reason: HOSPADM

## 2020-09-19 RX ORDER — PROMETHAZINE HYDROCHLORIDE 25 MG/1
12.5 TABLET ORAL EVERY 6 HOURS PRN
Status: DISCONTINUED | OUTPATIENT
Start: 2020-09-19 | End: 2020-09-23 | Stop reason: HOSPADM

## 2020-09-19 RX ORDER — ONDANSETRON 2 MG/ML
4 INJECTION INTRAMUSCULAR; INTRAVENOUS EVERY 6 HOURS PRN
Status: DISCONTINUED | OUTPATIENT
Start: 2020-09-19 | End: 2020-09-23 | Stop reason: HOSPADM

## 2020-09-19 RX ORDER — PALIPERIDONE 6 MG/1
6 TABLET, EXTENDED RELEASE ORAL EVERY MORNING
Status: DISCONTINUED | OUTPATIENT
Start: 2020-09-20 | End: 2020-09-23 | Stop reason: HOSPADM

## 2020-09-19 RX ORDER — ACETAMINOPHEN 325 MG/1
650 TABLET ORAL EVERY 6 HOURS PRN
Status: DISCONTINUED | OUTPATIENT
Start: 2020-09-19 | End: 2020-09-23 | Stop reason: HOSPADM

## 2020-09-19 RX ORDER — SODIUM CHLORIDE 0.9 % (FLUSH) 0.9 %
10 SYRINGE (ML) INJECTION PRN
Status: DISCONTINUED | OUTPATIENT
Start: 2020-09-19 | End: 2020-09-23 | Stop reason: HOSPADM

## 2020-09-19 RX ORDER — LIDOCAINE HYDROCHLORIDE ANHYDROUS AND DEXTROSE MONOHYDRATE .4; 5 G/100ML; G/100ML
INJECTION, SOLUTION INTRAVENOUS
Status: DISCONTINUED
Start: 2020-09-19 | End: 2020-09-19

## 2020-09-19 RX ORDER — LIDOCAINE HYDROCHLORIDE 20 MG/ML
INJECTION, SOLUTION INTRAVENOUS
Status: DISCONTINUED
Start: 2020-09-19 | End: 2020-09-19

## 2020-09-19 RX ORDER — ACETAMINOPHEN 650 MG/1
650 SUPPOSITORY RECTAL EVERY 6 HOURS PRN
Status: DISCONTINUED | OUTPATIENT
Start: 2020-09-19 | End: 2020-09-23 | Stop reason: HOSPADM

## 2020-09-19 RX ORDER — MULTIVIT-MIN/FA/LYCOPEN/LUTEIN .4-300-25
1 TABLET ORAL EVERY MORNING
COMMUNITY

## 2020-09-19 RX ORDER — DILTIAZEM HYDROCHLORIDE 5 MG/ML
10 INJECTION INTRAVENOUS ONCE
Status: COMPLETED | OUTPATIENT
Start: 2020-09-19 | End: 2020-09-19

## 2020-09-19 RX ADMIN — LIDOCAINE HYDROCHLORIDE 100 MG: 20 INJECTION, SOLUTION INTRAVENOUS at 16:34

## 2020-09-19 RX ADMIN — DEXTROSE MONOHYDRATE 5 MG/HR: 50 INJECTION, SOLUTION INTRAVENOUS at 17:11

## 2020-09-19 RX ADMIN — DILTIAZEM HYDROCHLORIDE 10 MG: 5 INJECTION INTRAVENOUS at 17:04

## 2020-09-19 RX ADMIN — QUETIAPINE FUMARATE 50 MG: 25 TABLET ORAL at 22:07

## 2020-09-19 RX ADMIN — RIVAROXABAN 20 MG: 20 TABLET, FILM COATED ORAL at 22:07

## 2020-09-19 ASSESSMENT — ENCOUNTER SYMPTOMS
HEMOPTYSIS: 0
CHEST TIGHTNESS: 0
RHINORRHEA: 0
NAUSEA: 0
SHORTNESS OF BREATH: 1
DIARRHEA: 0
COUGH: 0
ABDOMINAL PAIN: 0
SORE THROAT: 0
BACK PAIN: 0
CONSTIPATION: 0
WHEEZING: 0
VOMITING: 0
PHOTOPHOBIA: 0

## 2020-09-19 ASSESSMENT — PAIN SCALES - GENERAL: PAINLEVEL_OUTOF10: 0

## 2020-09-19 NOTE — ED NOTES
Bed: 10  Expected date: 9/19/20  Expected time:   Means of arrival: [facility]  Comments:  ALFA Browne, THERESA  09/19/20 0285

## 2020-09-19 NOTE — ED PROVIDER NOTES
breath. Negative for cough, hemoptysis, chest tightness and wheezing. Cardiovascular: Positive for palpitations and near-syncope. Negative for chest pain and leg swelling. Gastrointestinal: Negative for abdominal pain, constipation, diarrhea, nausea and vomiting. Genitourinary: Negative for decreased urine volume, dysuria, flank pain, frequency and urgency. Musculoskeletal: Negative for back pain and myalgias. Skin: Negative for pallor and rash. Neurological: Positive for dizziness, weakness and light-headedness. Negative for syncope, facial asymmetry, speech difficulty and numbness. Physical Exam  Vitals signs and nursing note reviewed. Constitutional:       General: He is not in acute distress. Appearance: Normal appearance. He is obese. He is not ill-appearing or diaphoretic. HENT:      Head: Normocephalic and atraumatic. Mouth/Throat:      Mouth: Mucous membranes are moist.   Eyes:      Pupils: Pupils are equal, round, and reactive to light. Cardiovascular:      Rate and Rhythm: Regular rhythm. Tachycardia present. Pulses: Normal pulses. Heart sounds: Normal heart sounds. Pulmonary:      Effort: Pulmonary effort is normal. No respiratory distress. Breath sounds: Normal breath sounds. No wheezing or rhonchi. Chest:      Chest wall: No tenderness. Abdominal:      General: Abdomen is flat. Palpations: Abdomen is soft. Tenderness: There is no abdominal tenderness. There is no guarding or rebound. Musculoskeletal:         General: No swelling or tenderness. Skin:     General: Skin is warm and dry. Neurological:      Mental Status: He is alert and oriented to person, place, and time.           Procedures     MDM  Number of Diagnoses or Management Options  Atrial fibrillation with RVR [facility]):   Near syncope:   Paroxysmal supraventricular tachycardia [facility]):   Diagnosis management comments: Patient is a 75-year-old male with history of atrial branch block. Comparison: changes compared to previous EKG    This EKG is signed and interpreted by me. Rate: 127  Rhythm: Atrial flutter with variable AV block and diffuse ST depression. Interpretation: Atrial flutter with variable AV block and diffuse ST depression. Comparison: changes compared to previous EKG      ------------------------- NURSING NOTES AND VITALS REVIEWED ---------------------------  Date / Time Roomed:  9/19/2020  4:28 PM  ED Bed Assignment:  [id number]    The nursing notes within the ED encounter and vital signs as below have been reviewed. Patient Vitals for the past 24 hrs:   BP Temp Temp src Pulse Resp SpO2 Height Weight   09/20/20 0000 (!) 108/55 98.3 °F (36.8 °C) Oral 66 16 98 % -- --   09/19/20 2100 138/81 97 °F (36.1 °C) Temporal 98 18 98 % 6' 2\" (1.88 m) 207 lb 6 oz (94.1 kg)   09/19/20 2019 (!) 116/90 97.6 °F (36.4 °C) -- 103 16 99 % -- --   09/19/20 1933 107/87 -- -- 115 18 99 % -- --   09/19/20 1914 (!) 107/91 -- -- 112 20 99 % -- --   09/19/20 1845 120/80 -- -- 127 16 98 % -- --   09/19/20 1826 123/73 -- -- 136 16 99 % -- --   09/19/20 1741 (!) 123/90 -- -- 128 16 98 % -- --   09/19/20 1712 (!) 123/92 -- -- 122 -- -- -- --   09/19/20 1711 -- -- -- 132 16 100 % -- --   09/19/20 1704 (!) 126/94 -- -- 130 16 99 % -- --   09/19/20 1643 (!) 130/99 -- -- 127 16 99 % -- --   09/19/20 1636 (!) 144/60 -- -- 129 -- -- -- --   09/19/20 1636 132/60 -- -- 174 -- -- -- --   09/19/20 1632 -- 97.6 °F (36.4 °C) Tympanic (!) 258 20 96 % -- 190 lb (86.2 kg)       Oxygen Saturation Interpretation: Normal      ------------------------------------------ PROGRESS NOTES ------------------------------------------  Re-evaluation(s):  Time: 1640. Patients symptoms show no change  Repeat physical examination is not changed    Time: 1730.   Patients symptoms are improving  Repeat physical examination is not changed    I have spoken with the patient and discussed todays results, in addition to

## 2020-09-20 LAB
ANION GAP SERPL CALCULATED.3IONS-SCNC: 14 MMOL/L (ref 7–16)
BASOPHILS ABSOLUTE: 0.07 E9/L (ref 0–0.2)
BASOPHILS RELATIVE PERCENT: 1 % (ref 0–2)
BUN BLDV-MCNC: 15 MG/DL (ref 8–23)
CALCIUM SERPL-MCNC: 9.2 MG/DL (ref 8.6–10.2)
CHLORIDE BLD-SCNC: 105 MMOL/L (ref 98–107)
CO2: 23 MMOL/L (ref 22–29)
CREAT SERPL-MCNC: 1.1 MG/DL (ref 0.7–1.2)
EKG ATRIAL RATE: 227 BPM
EKG ATRIAL RATE: 241 BPM
EKG P AXIS: -81 DEGREES
EKG Q-T INTERVAL: 164 MS
EKG Q-T INTERVAL: 294 MS
EKG QRS DURATION: 166 MS
EKG QRS DURATION: 84 MS
EKG QTC CALCULATION (BAZETT): 340 MS
EKG QTC CALCULATION (BAZETT): 415 MS
EKG R AXIS: -168 DEGREES
EKG R AXIS: 67 DEGREES
EKG T AXIS: 0 DEGREES
EKG T AXIS: 47 DEGREES
EKG VENTRICULAR RATE: 120 BPM
EKG VENTRICULAR RATE: 258 BPM
EOSINOPHILS ABSOLUTE: 0.11 E9/L (ref 0.05–0.5)
EOSINOPHILS RELATIVE PERCENT: 1.6 % (ref 0–6)
GFR AFRICAN AMERICAN: >60
GFR NON-AFRICAN AMERICAN: >60 ML/MIN/1.73
GLUCOSE BLD-MCNC: 95 MG/DL (ref 74–99)
HCT VFR BLD CALC: 40.8 % (ref 37–54)
HEMOGLOBIN: 13.1 G/DL (ref 12.5–16.5)
IMMATURE GRANULOCYTES #: 0.02 E9/L
IMMATURE GRANULOCYTES %: 0.3 % (ref 0–5)
INR BLD: 2.1
LYMPHOCYTES ABSOLUTE: 2 E9/L (ref 1.5–4)
LYMPHOCYTES RELATIVE PERCENT: 28.4 % (ref 20–42)
MAGNESIUM: 2 MG/DL (ref 1.6–2.6)
MCH RBC QN AUTO: 28.9 PG (ref 26–35)
MCHC RBC AUTO-ENTMCNC: 32.1 % (ref 32–34.5)
MCV RBC AUTO: 89.9 FL (ref 80–99.9)
MONOCYTES ABSOLUTE: 0.77 E9/L (ref 0.1–0.95)
MONOCYTES RELATIVE PERCENT: 10.9 % (ref 2–12)
NEUTROPHILS ABSOLUTE: 4.08 E9/L (ref 1.8–7.3)
NEUTROPHILS RELATIVE PERCENT: 57.8 % (ref 43–80)
PDW BLD-RTO: 13.1 FL (ref 11.5–15)
PHOSPHORUS: 3.2 MG/DL (ref 2.5–4.5)
PLATELET # BLD: 182 E9/L (ref 130–450)
PMV BLD AUTO: 10.8 FL (ref 7–12)
POTASSIUM SERPL-SCNC: 4.2 MMOL/L (ref 3.5–5)
PROTHROMBIN TIME: 23.8 SEC (ref 9.3–12.4)
RBC # BLD: 4.54 E12/L (ref 3.8–5.8)
SODIUM BLD-SCNC: 142 MMOL/L (ref 132–146)
WBC # BLD: 7.1 E9/L (ref 4.5–11.5)

## 2020-09-20 PROCEDURE — 6370000000 HC RX 637 (ALT 250 FOR IP): Performed by: INTERNAL MEDICINE

## 2020-09-20 PROCEDURE — 93010 ELECTROCARDIOGRAM REPORT: CPT | Performed by: INTERNAL MEDICINE

## 2020-09-20 PROCEDURE — 84100 ASSAY OF PHOSPHORUS: CPT

## 2020-09-20 PROCEDURE — 99223 1ST HOSP IP/OBS HIGH 75: CPT | Performed by: INTERNAL MEDICINE

## 2020-09-20 PROCEDURE — APPSS60 APP SPLIT SHARED TIME 46-60 MINUTES: Performed by: PHYSICIAN ASSISTANT

## 2020-09-20 PROCEDURE — 2580000003 HC RX 258: Performed by: INTERNAL MEDICINE

## 2020-09-20 PROCEDURE — 36415 COLL VENOUS BLD VENIPUNCTURE: CPT

## 2020-09-20 PROCEDURE — 85025 COMPLETE CBC W/AUTO DIFF WBC: CPT

## 2020-09-20 PROCEDURE — 85610 PROTHROMBIN TIME: CPT

## 2020-09-20 PROCEDURE — 2140000000 HC CCU INTERMEDIATE R&B

## 2020-09-20 PROCEDURE — 2500000003 HC RX 250 WO HCPCS: Performed by: INTERNAL MEDICINE

## 2020-09-20 PROCEDURE — 83735 ASSAY OF MAGNESIUM: CPT

## 2020-09-20 PROCEDURE — 93005 ELECTROCARDIOGRAM TRACING: CPT | Performed by: INTERNAL MEDICINE

## 2020-09-20 PROCEDURE — 99255 IP/OBS CONSLTJ NEW/EST HI 80: CPT | Performed by: INTERNAL MEDICINE

## 2020-09-20 PROCEDURE — 80048 BASIC METABOLIC PNL TOTAL CA: CPT

## 2020-09-20 RX ORDER — LISINOPRIL 5 MG/1
2.5 TABLET ORAL DAILY
Status: DISCONTINUED | OUTPATIENT
Start: 2020-09-20 | End: 2020-09-23 | Stop reason: HOSPADM

## 2020-09-20 RX ORDER — METOPROLOL SUCCINATE 50 MG/1
50 TABLET, EXTENDED RELEASE ORAL 2 TIMES DAILY
Status: DISCONTINUED | OUTPATIENT
Start: 2020-09-20 | End: 2020-09-20 | Stop reason: SDUPTHER

## 2020-09-20 RX ORDER — METOPROLOL SUCCINATE 50 MG/1
50 TABLET, EXTENDED RELEASE ORAL 2 TIMES DAILY
Status: DISCONTINUED | OUTPATIENT
Start: 2020-09-20 | End: 2020-09-23 | Stop reason: HOSPADM

## 2020-09-20 RX ADMIN — METOPROLOL SUCCINATE 50 MG: 50 TABLET, EXTENDED RELEASE ORAL at 21:16

## 2020-09-20 RX ADMIN — METOPROLOL SUCCINATE 50 MG: 50 TABLET, EXTENDED RELEASE ORAL at 18:31

## 2020-09-20 RX ADMIN — DEXTROSE MONOHYDRATE 15 MG/HR: 50 INJECTION, SOLUTION INTRAVENOUS at 17:28

## 2020-09-20 RX ADMIN — PALIPERIDONE 6 MG: 6 TABLET, EXTENDED RELEASE ORAL at 09:05

## 2020-09-20 RX ADMIN — SODIUM CHLORIDE, PRESERVATIVE FREE 10 ML: 5 INJECTION INTRAVENOUS at 09:05

## 2020-09-20 RX ADMIN — QUETIAPINE FUMARATE 50 MG: 25 TABLET ORAL at 21:15

## 2020-09-20 RX ADMIN — LISINOPRIL 2.5 MG: 5 TABLET ORAL at 14:54

## 2020-09-20 RX ADMIN — DEXTROSE MONOHYDRATE 15 MG/HR: 50 INJECTION, SOLUTION INTRAVENOUS at 21:18

## 2020-09-20 RX ADMIN — RIVAROXABAN 20 MG: 20 TABLET, FILM COATED ORAL at 17:27

## 2020-09-20 RX ADMIN — DEXTROSE MONOHYDRATE 10 MG/HR: 50 INJECTION, SOLUTION INTRAVENOUS at 01:33

## 2020-09-20 RX ADMIN — DEXTROSE MONOHYDRATE 10 MG/HR: 50 INJECTION, SOLUTION INTRAVENOUS at 14:55

## 2020-09-20 ASSESSMENT — PAIN SCALES - GENERAL
PAINLEVEL_OUTOF10: 0

## 2020-09-20 NOTE — CONSULTS
Inpatient Cardiology Consultation      Reason for Consult: Cardiomyopathy    Consulting Physician: Dr. Audra Yeh    Requesting Physician: Dr. Jarrod Neely    Date of Consultation: 9/20/2020    HISTORY OF PRESENT ILLNESS:   Patient is a 56year old AAM who follows with Dr. Tien Wadsworth and Dr. January Mcpherson outpatient. He is being seen in consultation this hospital admission by Dr. Audra Yeh for evaluation and recommendations regarding known presumed non-ischemic cardiomyopathy. Patient has a past medical history of former tobacco abuse, former alcohol abuse, hypertension, schizoaffective disorder, paroxysmal atrial flutter on Xarelto therapy outpatient, valvular heart disease and history of medical non-compliance. Patient was first diagnosed with atrial flutter in November 2016 and underwent successful KADEEM guided DCCV at that time. KADEEM revealed LVEF of 30%. He was placed on appropriate goal-directed medical therapy at that time, and given a LifeVest.  Repeat transthoracic echocardiogram in January 2017 revealed improvement in the EF to 55%. In December 2019, patient was hospitalized with atrial flutter with RVR. He again underwent successful KADEEM guided direct current cardioversion at that time, with KADEEM revealing new drop in EF to 20 to 25%. He was placed on appropriate goal-directed medical therapy and discharged home on Xarelto. Patient now presented to [facility] on September 19, 2020 from his group home due to complaints of dizziness, near syncope and palpitations. Patient states that yesterday, he was walking in the hallway at his group home when he suddenly began to feel extremely lightheaded. He states he felt near syncopal, and had to hold onto the wall hand railing in order to avoid falling down and possibly passing out. He denies actual syncope.   He states this occurred one more time throughout the day where he became severely lightheaded and near syncopal.  Both episodes of dizziness/near syncope, patient additionally pleural effusion. Atrial flutter. 7. LV dysfunction presumed secondary to tachycardia and history of alcohol abuse. [address] not recommended by EP or general cardiology given medical non-compliance. Was discharged on Eliquis by primary service 1/2017.  8. TTE 1/24/2017 TTE Dr Yoanna Grant: No significant valve disease. Normal left ventricle size. Mild concentric left ventricular hypertrophy. No wall motion abnormalities. Indeterminate diastolic function. Ejection fraction is visually estimated at 55%. Study performed in sinus rhythm  9. 1/25/2017 per EP Lift Vest discontinued \"based on the improvement in is systolic heart function his [id number] score is now 0. Given the fact he is non-compliant with medications and his low [id number] score I would recommend discontinuation of Eliquis and consideration can be given to ASA 81 mg daily. I would not recommend AAD therapy given his history of schizo-affective disorder, non-compliance and alcohol consumption. \"  10. Previous medical non compliance secondary to psych disorder but now resides in group home and is taking medications per consultation in December 2019. He was discharged on Xarelto, Toprol-XL 50 mg BID, lisinopril 2.5 mg daily. 11. Underwent successful KADEEM-guided DCCV by Dr. Jorge Kovacs 12/10/2019. KADEEM: Summary: Mildly dilated left ventricle. Severely reduced left ventricular systolic function, EF 95-03%. Moderately dilated left atrium. There was evidence of spontaneous echocardiographic contrast in the left atrium. Right ventricle global systolic function is moderately reduced. Moderately enlarged right atrium size. Spontaneous echocardiographic contrast present. Mild to moderate mitral regurgitation is present. Mild tricuspid regurgitation. PAST SURGICAL HISTORY:    No past surgical history on file. HOME MEDICATIONS:  Prior to Admission medications    Medication Sig Start Date End Date Taking?  Authorizing Provider   ibuprofen (ADVIL;MOTRIN) 800 MG tablet Take 800 mg by mouth every 8 hours as needed for Pain   Yes Historical Provider, MD   Multiple Vitamins-Minerals (CERTAVITE SENIOR/ANTIOXIDANT) TABS Take 1 tablet by mouth every morning   Yes Historical Provider, MD   rivaroxaban (XARELTO) 20 MG TABS tablet Take 1 tablet by mouth Daily with supper 12/11/19  Yes Anthony Rodriguez MD   lisinopril (PRINIVIL;ZESTRIL) 2.5 MG tablet Take 1 tablet by mouth daily 12/12/19  Yes Anthony Rodriguez MD   metoprolol succinate (TOPROL XL) 50 MG extended release tablet Take 1 tablet by mouth 2 times daily 12/11/19  Yes Anthony Rodriguez MD   QUEtiapine (SEROQUEL) 50 MG tablet Take 50 mg by mouth nightly   Yes Historical Provider, MD   paliperidone (INVEGA) 6 MG extended release tablet Take 6 mg by mouth every morning   Yes Historical Provider, MD       CURRENT MEDICATIONS:      Current Facility-Administered Medications:     [COMPLETED] dilTIAZem injection 10 mg, 10 mg, Intravenous, Once, 10 mg at 09/19/20 1704 **FOLLOWED BY** dilTIAZem 100 mg in dextrose 5 % 100 mL infusion ([facility]), 5 mg/hr, Intravenous, Continuous, Kelly Mercedes MD, Last Rate: 15 mL/hr at 09/20/20 0502, 15 mg/hr at 09/20/20 0502    paliperidone (INVEGA) extended release tablet 6 mg, 6 mg, Oral, QAM, Kelly Mercedes MD, 6 mg at 09/20/20 0905    QUEtiapine (SEROQUEL) tablet 50 mg, 50 mg, Oral, Nightly, Kelly Mercedes MD, 50 mg at 09/19/20 2207    rivaroxaban (XARELTO) tablet 20 mg, 20 mg, Oral, Gissel Penaloza MD, 20 mg at 09/19/20 2207    sodium chloride flush 0.9 % injection 10 mL, 10 mL, Intravenous, 2 times per day, Kelly Mercedes MD, 10 mL at 09/20/20 0905    sodium chloride flush 0.9 % injection 10 mL, 10 mL, Intravenous, PRN, Kelly Mercedes MD    acetaminophen (TYLENOL) tablet 650 mg, 650 mg, Oral, Q6H PRN **OR** acetaminophen (TYLENOL) suppository 650 mg, 650 mg, Rectal, Q6H PRN, Kelly Mercedes MD    polyethylene glycol (GLYCOLAX) packet 17 g, 17 g, Oral, Daily PRN, Kelly Mercedes MD    promethazine (PHENERGAN) tablet 12.5 mg, 12.5 mg, Oral, Q6H PRN **OR** ondansetron (ZOFRAN) injection 4 mg, 4 mg, Intravenous, Q6H PRN, Joanne Rosales MD    perflutren lipid microspheres (DEFINITY) injection 1.65 mg, 1.5 mL, Intravenous, ONCE PRN, Joanne Rosales MD      ALLERGIES:  Patient has no known allergies. SOCIAL HISTORY:    He states he is a former tobacco smoker, quit approximately five months ago. Smoked one ppd since he was a teenager. Former heavy alcohol abuse, quit in 2017. Denies any current or former illicit drug use. FAMILY HISTORY:   Denies any pertinent cardiac family medical history at this time. REVIEW OF SYSTEMS:     · Constitutional: +generalized weakness/fatigue with exertion. Denies fevers, chills, night sweats. Denies significant weight loss or weight gain. · HEENT: Denies headaches, nose bleeds, rhinorrhea, sore throat. Denies blurred vision. Denies dysphagia, odynophagia. · Musculoskeletal: Denies falls, pain to BLE with ambulation. · Neurological: +dizziness, +near syncope. Denies numbness and tingling. Denies focal neurological deficits. · Cardiovascular: +atypical chest discomfort, +palpitations. Denies diaphoresis, syncope. Denies PND, orthopnea, peripheral edema. · Respiratory: Denies shortness of breath at rest or with exertion. Denies cough, hemoptysis. · Gastrointestinal: Denies abdominal pain, nausea/vomiting, diarrhea and constipation, black/bloody, and tarry stools. · Genitourinary: Denies dysuria and hematuria. · Hematologic: Denies excessive bruising or bleeding. · Endocrine: Denies excessive thirst. Denies intolerance to hot and cold. · Psychiatric: +schizoaffective disorder. PHYSICAL EXAM:   BP 99/66   Pulse 118   Temp 98.1 °F (36.7 °C) (Temporal)   Resp 13   Ht 6' 2\" (1.88 m)   Wt 207 lb 6 oz (94.1 kg)   SpO2 98%   BMI 26.63 kg/m²   CONST:  Well developed, well nourished AAM who appears stated age.  Awake, alert, cooperative, no apparent distress. HEENT:   Head- Normocephalic, atraumatic. Eyes- Conjunctivae pink, anicteric. Throat- Oral mucosa pink and moist.  Neck-  No stridor, trachea midline, no apparent jugular venous distention. CHEST: Chest symmetrical and non-tender to palpation. No accessory muscle use or intercostal retractions. RESPIRATORY: Lung sounds - clear throughout fields. No wheezing, rales or rhonchi. CARDIOVASCULAR:     Heart Inspection- shows no noted pulsations. Heart Palpation- no heaves or thrills. Heart Ausculation- Regular rhythm with fast rate, no apparent murmur (hard to assess at this time due to tachycardia). PV: Trace-1+ bilateral lower extremity edema. Pedal pulses palpable, no clubbing or cyanosis. ABDOMEN: Soft, non-tender to light palpation. Bowel sounds present. MS: Good muscle strength and tone. No atrophy or abnormal movements. SKIN: Warm and dry. NEURO / PSYCH: Oriented to person, place. Speech clear and appropriate. Follows all commands. Pleasant affect. DATA:    Telemetry: Atrial flutter with RVR, HR in the 100s-130s    Diagnostic:  All diagnostic testing and lab work thus far this admission reviewed in detail. CXR 09/19/2020: Impression:  No acute cardiopulmonary pathology.            Intake/Output Summary (Last 24 hours) at 9/20/2020 1148  Last data filed at 9/20/2020 0409  Gross per 24 hour   Intake 607 ml   Output 300 ml   Net 307 ml       Labs:   CBC:   Recent Labs     09/19/20 1645 09/20/20  0605   WBC 7.3 7.1   HGB 12.7 13.1   HCT 39.9 40.8    182     BMP:   Recent Labs     09/19/20  1645 09/20/20  0605    142   K 4.5 4.2   CO2 23 23   BUN 11 15   CREATININE 1.0 1.1   LABGLOM >60 >60   CALCIUM 9.1 9.2     Mag:   Recent Labs     09/19/20  1645 09/20/20  0605   MG 1.8 2.0     Phos:   Recent Labs     09/20/20  0605   PHOS 3.2     TSH:   Recent Labs     09/19/20  1645   TSH 1.460     PT/INR:   Recent Labs     09/20/20  0605   PROTIME 23.8*   INR 2.1     CARDIAC ENZYMES:  Recent Labs     09/19/20  1645   TROPONINI <0.01     FASTING LIPID PANEL:  Lab Results   Component Value Date    CHOL 167 01/16/2018    HDL 38 01/16/2018    LDLCALC 99 01/16/2018    TRIG 149 01/16/2018     LIVER PROFILE:  Recent Labs     09/19/20  1645   AST 22   ALT 18   LABALBU 4.0             Assessment and plan to follow as per Dr. Valentino Caprio. Amanda Mack, [address] [place] [address], [place] [address] Cardiology    Electronically signed by Franny Watson PA-C on 9/20/2020 at 11:48 AM   The above documentation has been prepared under my direction and personally reviewed by me in its entirety. I confirm that the note above accurately reflects all work, treatment, procedures, and medical decision making performed by me.     The patient's history was independently obtained. The patient was independently examined. Electrocardiogram, prior and present cardiovascular assessment, and laboratory studies were reviewed.     The patient is a 55-year-old black male known to [facility] with primary cardiovascular care provided by [facility]. He has a history of a likely nonischemic cardiomyopathy of a tachycardia mediated origin in the face of recurrent paroxysmal atrial flutter as well is that of a history of chronic obstructive lung disease, prior excessive ethanol consumption and a schizoaffective disorder. He is previously undergone a transesophageal echocardiographic guided cardioversion in November, 2016 in the face of severe left ventricular systolic dysfunction with improvement of ventricular function on a follow-up echocardiogram in sinus rhythm.   He was most recently evaluated approximately 9 months earlier following recurrent atrial flutter with suboptimal rate control again underwent a transesophageal echocardiographic guided cardioversion with his transesophageal echocardiogram demonstrating evidence of a mildly dilated left ventricular chamber with severe left ventricular systolic dysfunction with an estimated resumption of optimal medical therapy in the face of his previous left ventricular systolic dysfunction, severe at the time of his most recent arrhythmia recurrence and without reevaluation. In addition, an echocardiogram is pending for reevaluation of left ventricular systolic function to guide additional management including that influencing recommendations by the electrophysiology service. Cardioversion would likely be advisable to restore sinus rhythm necessitating discussion with his group care home regarding compliance with the administration of his oral anticoagulation to determine the potential needs of associated transesophageal echocardiographic guidance. Presently no ischemic symptomatology is suggested and based on his most recent echocardiographic findings a biventricular dysfunction, a nonischemic as opposed to that of an ischemic source of left ventricular systolic dysfunction is suggested.     Thank you for allowing me to participate in your patient's care. Please feel free to contact me if you have any questions or concerns.     Ovidio Elizabeth, [address] [facility]

## 2020-09-20 NOTE — PROGRESS NOTES
[facility] [address]    Attending Physician Statement  I have discussed the case, including pertinent history (medical, surgical, family and social) and exam findings with the resident and the team.  I have seen and examined the patient and the key elements of the encounter have been performed by me. I agree with the assessment, plan and orders as documented by the resident. The patient was seen earlier by me on rounds with the team.    Patient with known A Fib and CHF and CAD - presented with near syncope and fast HR and palpitations - found to be A Fib RVR - last CV 9 months ago - started on Cardizem gtt and beta blocker and continued Eliquis. He is on telemetry. Improved now - EP is consulted. Will continue to follow. Echo and enzymes cycled. I have reviewed my findings and recommendations with Gale Salcido. Remainder of medical problems as per resident note.       Abdulkadir Abarca M.D., [facility]  Internal Medicine Residency Faculty

## 2020-09-20 NOTE — H&P
[facility]  Internal Medicine Residency Program  History and Physical    Patient:  Ceferino Chase 56 y.o. male MRN: [id number]     Date of Service: 9/19/2020    Hospital Day: 1      Chief complaint: had concerns including Tachycardia (SVT). History of Present Illness   Ceferino Chase is a 56 y.o. male who presented with chief complaint of palpitations, lightheadedness and unsteadiness. Patient resides in a group home called [address] [place] [address]. Reports he was walking the hallway of his group home this afternoon when he had 2 episodes of feeling lightheaded and unsteady where he had to hold onto the wall to prevent himself from falling. He reports he did not fall or hit his head however his eyes did close for a couple seconds. He reports he felt that his heart was beating very fast.  He notes that these episodes both lasted approximately 30 seconds. He told the people at the group home and EMS brought him to the ED. Patient reports he had similar symptoms in December 2019 at which time he underwent KADEEM cardioversion with return to normal sinus rhythm. He reports he has not felt any palpitations or had symptoms of dizziness since then. Patient also complains of chronic weakness for the last couple months since his last admission in December 2019. He notes the weakness has improved slightly since then. Patient's past medical history significant for atrial fibrillation (on metoprolol and Xarelto 20 mg), HFrEF (EF 25% in 2019), CAD, schizophrenia on quetiapine and paliperidone. He is not on goal directed medical therapy for his heart failure. Denies fevers, chills, headache, CP, abdominal pain, N/V, constipation or diarrhea or paraesthesias of b/l upper and lower extremities. He denies any recent exposure to any sick contacts. He denies any changes in his diet. He denies any drug use or alcohol use.   He reports he quit smoking 5 months ago and has not touched a cigarette since then.    Patient reports he lives in a group home because he was unable to keep up with his apartment given his heart condition. Per EMR, in November 2016 patient was noted to have LVD with an EF of 30% and moderate to severe MR on KADEEM and he underwent [facility] and was fitted for a LifeVest.  Few weeks later he was properly shock for VT and went to the hospital for admission for ICD, however at that time it was noted his EF was improved to 55% and ICD was not placed. Patient had symptoms of syncope in 2019 his EF was found to be 25%. He is currently not on goal directed medical therapy. Per EMR, doubt patient has followed up with cardiologist.     ED Course: Patient heart rate found to be in the 200s and found to have A.flutter rhythm. He was started on diltiazem drip. Transferred to the floors. Past Medical History:      Diagnosis Date    CAD (coronary artery disease)     [facility]congest[facility] failure) [facility])     Pneumonia     Schizo affective schizophrenia ([facility] [address]        Past Surgical History:    No past surgical history on file. Medications Prior to Admission:    Prior to Admission medications    Medication Sig Start Date End Date Taking?  Authorizing Provider   ibuprofen (ADVIL;MOTRIN) 800 MG tablet Take 800 mg by mouth every 8 hours as needed for Pain   Yes Historical Provider, MD   Multiple Vitamins-Minerals (CERTAVITE SENIOR/ANTIOXIDANT) TABS Take 1 tablet by mouth every morning   Yes Historical Provider, MD   rivaroxaban (XARELTO) 20 MG TABS tablet Take 1 tablet by mouth Daily with supper 12/11/19  Yes Yamilex De Leon MD   lisinopril (PRINIVIL;ZESTRIL) 2.5 MG tablet Take 1 tablet by mouth daily 12/12/19  Yes Yamilex De Leon MD   metoprolol succinate (TOPROL XL) 50 MG extended release tablet Take 1 tablet by mouth 2 times daily 12/11/19  Yes Yamilex De Leon MD   QUEtiapine (SEROQUEL) 50 MG tablet Take 50 mg by mouth nightly   Yes Historical Provider, MD   paliperidone (INVEGA) 6 MG extended release erythema  · Head: normocephalic and atraumatic  · Eyes: pupils equal, round, and reactive to light, extraocular eye movements intact, conjunctivae normal  · ENT: tympanic membrane, external ear and ear canal normal bilaterally, nose without deformity, nasal mucosa and turbinates normal without polyps  · Neck: supple and non-tender without mass, no thyromegaly or thyroid nodules, no cervical lymphadenopathy  · Pulmonary/Chest: clear to auscultation bilaterally- no wheezes, rales or rhonchi, normal air movement, no respiratory distress  · Cardiovascular: irregular rate, irregular rhythm, normal S1 and S2, no murmurs, rubs, clicks, or gallops, distal pulses intact, no carotid bruits  · Abdomen: soft, non-tender, non-distended, normal bowel sounds, no masses or organomegaly  · Extremities: no cyanosis, clubbing or edema  · Musculoskeletal: normal range of motion, no joint swelling, deformity or tenderness  · Neurologic: reflexes normal and symmetric, no cranial nerve deficit, gait, coordination and speech normal.  Patient's right arm with slight tremors (reports this is chronic). Equal strength throughout.    Labs and Imaging Studies   Basic Labs  Recent Labs     20  1645      K 4.5      CO2 23   BUN 11   CREATININE 1.0   GLUCOSE 109*   CALCIUM 9.1       Recent Labs     20  1645   WBC 7.3   RBC 4.39   HGB 12.7   HCT 39.9   MCV 90.9   MCH 28.9   MCHC 31.8*   RDW 13.0      MPV 10.3       CBC:   Lab Results   Component Value Date    WBC 7.3 2020    RBC 4.39 2020    HGB 12.7 2020    HCT 39.9 2020    MCV 90.9 2020    RDW 13.0 2020     2020     CMP:  Lab Results   Component Value Date     2020    K 4.5 2020    K 4.4 2019     2020    CO2 23 2020    BUN 11 2020    PROT 7.3 2020       Imaging Studies:     Xr Chest Portable    Result Date: 2020  Patient MRN: [id number] : 1960 Age:  56 years Gender: Male Order Date: 9/19/2020 5:00 PM Exam: XR CHEST PORTABLE Number of Images: 1 view Indication:  Chest pain Comparison: Anterior upright chest December 6, 2019 and 2 view upright chest January 24, 2017 Findings: The lungs are symmetrically expanded, and are clear. There is no evidence of pneumothorax or pleural effusion. Cardiovascular shadows are normal in appearance. There is no evidence of cardiac enlargement or decompensation. Skeletal structures show no evidence of acute pathology. Overlying EKG leads are present. No acute cardiopulmonary pathology. EKG: Atrial flutter    Resident's Assessment and Plan     Yuliya Matthews is a 56 y.o. male past medical history significant for atrial fibrillation (on metoprolol and Xarelto 20 mg), HFrEF (EF 25% in 2019), CAD, schizophrenia on quetiapine and paliperidone. 1.  Symptomatic palpitations likely secondary to atrial flutter 2:1   -EKG in ED reviewed by me. Appears to be a flutter 2:1.   -Continue to monitor on telemetry. Patient is currently on diltiazem drip. -Opponent negative. Electrolytes within normal limits. CBC within normal limits INR 1.3. PTT 14. 6. proBNP 588.   -We will check T4 and TSH. 2.  Pre-syncopal episode likely secondary to atrial flutter 2:1   -Experienced a presyncopal episode while at the group home which required him to hold onto the wall. He denies any trauma to his head or any fall. Denies any confusion. Alert and oriented x3.    3.  Hx of Atrial flutter/atrial fibrillation   -Patient is on Xarelto 20 mg daily. And metoprolol.    -Pt [id number] 2.    -Continue Xarelto. Will hold metoprolol as patient is getting carvedilol and do not want to use double beta-blockers.   -Continue diltiazem drip. Repeat EKG tomorrow. 4.  Schizophrenia   -Continue quetiapine and paliperidone. Patient QTC corrected 417. We will continue medications and continue to monitor QTC corrected.     5.  Generalized weakness and right hand tremor   -This appears to be a chronic issue as per the patient.   -PT OT Daniel appreciated. 6. HFrEF likely 2/2 nonischemic cardiomyopathy?    -  November 2016 patient was noted to have LVD with an EF of 30% and moderate to severe MR on KADEEM and he underwent [facility] and was fitted for a LifeVest.     - Few weeks later he was properly shock for VT and went to the hospital for admission for ICD, however at that time it was noted his EF was improved to 55% and ICD was not placed. Patient had symptoms of syncope in 2019 his EF was found to be 25%. He is currently not on goal directed medical therapy. - Echo ordered. - Recommend starting goal directed medical therapy when patient stable. -Electrophysiology consult. Patient EF<35% (25%in 2019). He is a candidate for LifeVest.     7. HTN   -We will hold patient home lisinopril. Blood pressure is currently borderline normal.  Will resume when patient able to tolerate. PT/OT evaluation: Recs appreciated. DVT prophylaxis/ GI prophylaxis: Xarelto/ ppi   Disposition: Continue inpatient mngt.      Mary Marlow DO, PGY-1   Attending physician: Dr. Jett Pollard

## 2020-09-20 NOTE — PLAN OF CARE
Problem: Falls - Risk of:  Goal: Will remain free from falls  Description: Will remain free from falls  9/20/2020 0940 by Eliana Hicks RN  Outcome: Met This Shift    Goal: Absence of physical injury  Description: Absence of physical injury  9/20/2020 0940 by Eliana Hicks RN  Outcome: Met This Shift

## 2020-09-20 NOTE — CONSULTS
The above documentation has been prepared under my direction and personally reviewed by me in its entirety. I confirm that the note above accurately reflects all work, treatment, procedures, and medical decision making performed by me. The patient's history was independently obtained. The patient was independently examined. Electrocardiogram, prior and present cardiovascular assessment, and laboratory studies were reviewed. The patient is a 56-year-old black male known to [facility] with primary cardiovascular care provided by Robyn Benedict. He has a history of a likely nonischemic cardiomyopathy of a tachycardia mediated origin in the face of recurrent paroxysmal atrial flutter as well is that of a history of chronic obstructive lung disease, prior excessive ethanol consumption and a schizoaffective disorder. He is previously undergone a transesophageal echocardiographic guided cardioversion in November, 2016 in the face of severe left ventricular systolic dysfunction with improvement of ventricular function on a follow-up echocardiogram in sinus rhythm. He was most recently evaluated approximately 9 months earlier following recurrent atrial flutter with suboptimal rate control again underwent a transesophageal echocardiographic guided cardioversion with his transesophageal echocardiogram demonstrating evidence of a mildly dilated left ventricular chamber with severe left ventricular systolic dysfunction with an estimated ejection fraction of 20 to 25% with associated right ventricular dysfunction and biatrial enlargement with mild to moderate mitral regurgitation. He presently resides at a group home ensuring his compliance with medical management but unfortunately had undergone no subsequent cardiovascular follow-up.   His present hospitalization was prompted by the development of recurrent palpitations as well as episodes of near syncope without additional arrhythmia related symptoms, dyspnea or changes of his functional capabilities. He relates transient atypically described chest discomfort with no ischemic symptomatology. Upon presentation to the emergency room, a resting electrocardiogram reviewed at the time of evaluation demonstrated evidence of atrial flutter with associated nonspecific ST changes chest x-ray again reviewed demonstrated no evidence of cardiomegaly or infiltrate. Cardiac biomarkers were normal with a proBNP level of 590 pg/mL and no metabolic derangements. At the time of evaluation he is medications and allergies were reviewed as well as that of his past medical history and review of systems as documented. On examination, he presently appears in no discomfort nor distress and is hemodynamically stable with vital signs as documented. Jugular venous pressure is normal with no identified carotid bruits. Lung fields are clear to auscultation. Cardiac examination is notable for an irregular rhythm consistent with that of his atrial arrhythmias with no third heart sound or cardiac murmur. A benign abdominal examination is present no peripheral edema identified. Diagnostic Assessment and Plan: On a clinical basis, the patient presents with recurrent paroxysmal atrial flutter of uncertain duration in the face of a previously identified nonischemic and likely tachycardia mediated cardiomyopathy. Presently rate control has been largely reestablished with intravenous diltiazem with needs of resumption of optimal medical therapy in the face of his previous left ventricular systolic dysfunction, severe at the time of his most recent arrhythmia recurrence and without reevaluation. In addition, an echocardiogram is pending for reevaluation of left ventricular systolic function to guide additional management including that influencing recommendations by the electrophysiology service.   Cardioversion would likely be advisable to restore sinus rhythm necessitating discussion with his group care home regarding compliance with the administration of his oral anticoagulation to determine the potential needs of associated transesophageal echocardiographic guidance. Presently no ischemic symptomatology is suggested and based on his most recent echocardiographic findings a biventricular dysfunction, a nonischemic as opposed to that of an ischemic source of left ventricular systolic dysfunction is suggested. Thank you for allowing me to participate in your patient's care. Please feel free to contact me if you have any questions or concerns. Neelima Jones.  Jaxson Vazquez, [address] [facility]

## 2020-09-20 NOTE — PROGRESS NOTES
Message was sent via perfect serve to EP bc pt's hr is sustaining in the 140's with cardizem @ 15      EP said to give metoprolol succ early and to keep pt NPO after midnight.

## 2020-09-20 NOTE — PROGRESS NOTES
[facility]  Internal Medicine Residency Program  Progress Note - House Team 1    Patient:  Eden Guy 56 y.o. male MRN: [id number]     Date of Service: 9/20/2020     CC: palpitations, lightheadedness and unsteadiness  Overnight events: None  Days since admission: 2    Subjective     Patient was seen at bed side in the am. They are awake alert and respond to questions appropriately. They have no new complaints or events overnight. HR ranges between 115-66 overnight. Pt denies chest pain or sob. Will f/u EP recommendations and decide on pt diet this noon. Objective     Physical Exam:  · Vitals: BP (!) 108/55   Pulse 66   Temp 98.3 °F (36.8 °C) (Oral)   Resp 16   Ht 6' 2\" (1.88 m)   Wt 207 lb 6 oz (94.1 kg)   SpO2 98%   BMI 26.63 kg/m²     · I & O - 24hr: No intake/output data recorded. · General Appearance: alert and oriented to person, place and time, well developed and well- nourished, in no acute distress. Cooperative -American male. Does not appear to be in any acute distress.   · Skin: warm and dry, no rash or erythema  · Head: normocephalic and atraumatic  · Eyes: pupils equal, round, and reactive to light, extraocular eye movements intact, conjunctivae normal  · ENT: tympanic membrane, external ear and ear canal normal bilaterally, nose without deformity, nasal mucosa and turbinates normal without polyps  · Neck: supple and non-tender without mass, no thyromegaly or thyroid nodules, no cervical lymphadenopathy  · Pulmonary/Chest: clear to auscultation bilaterally- no wheezes, rales or rhonchi, normal air movement, no respiratory distress  · Cardiovascular: irregular rate, irregular rhythm, normal S1 and S2, no murmurs, rubs, clicks, or gallops, distal pulses intact, no carotid bruits  · Abdomen: soft, non-tender, non-distended, normal bowel sounds, no masses or organomegaly  · Extremities: no cyanosis, clubbing or edema  · Musculoskeletal: normal range of motion, no recommendations     2. Hx of Atrial flutter/atrial fibrillation              -Patient is on Xarelto 20 mg daily.               -Pt [id number] 2.               -Continue Xarelto.  hold metoprolol               -Continue diltiazem drip. Repeat EKG tomorrow. 3.  Pre-syncopal episode likely secondary to atrial flutter 2:1              -Experienced a presyncopal episode while at the group home which required him to hold onto the wall. He denies any trauma to his head or any fall. Denies any confusion. Alert and oriented x3.     4.  Schizophrenia              -Continue quetiapine and paliperidone. Patient QTC corrected 417. We will continue medications and continue to monitor QTC corrected.     5.  Generalized weakness and right hand tremor              -This appears to be a chronic issue as per the patient. -PT evaluation requested     6. HFrEF likely 2/2 nonischemic cardiomyopathy?               -  November 2016 patient was noted to have LVD with an EF of 30% and moderate to severe MR on KADEEM and he underwent [facility] and was fitted for a LifeVest.                - Few weeks later he was properly shock for VT and went to the hospital for admission for ICD, however at that time it was noted his EF was improved to 55% and ICD was not placed. Patient had symptoms of syncope in 2019 his EF was found to be 25%. He is currently not on goal directed medical therapy. - Echo ordered. - Recommend starting goal directed medical therapy when patient stable. -Electrophysiology consult. Patient EF<35% (25%in 2019). He is a candidate for LifeVest.      7. HTN              -We will hold patient home lisinopril. Blood pressure is currently borderline normal.  Will resume when patient able to tolerate.     PT/OT evaluation: PT requested   DVT prophylaxis/ GI prophylaxis: Xarelto/ ppi   Disposition: Continue inpatient mngt.        Jarad Sherwood MD, PGY-1  Attending physician: Dr. Wilber Champion

## 2020-09-20 NOTE — CONSULTS
Cardiac Electrophysiology Consultation Note    Mariposa Key  1960  Date of Service: 9/20/2020  PCP: No primary care provider on file. Cardiologist: Dr Della Richey  Electrophysiologist: Dr Juanis Benoit    Reason for Consultation: Atrial flutter    SUBJECTIVE: Mariposa Key is a 57 yo male who I was asked to see in Cardiac Electrophysiology consultation for management of atrial flutter. He was diagnosed with new onset atrial flutter in November 2016 in the setting setting of alcohol abuse, decompensated heart failure, LVEF 30% at that time. He was cardioverted on 11/16/16 and placed on metoprolol 50 mg twice daily. 1/25/2017: Seen by Dr. Juanis Benoit with electrophysiology with shortness of breath palpitations. He had also had a LifeVest shock secondary to his arrhythmia, patient was noncompliant with medications. Repeat LVEF was 55% so LifeVest was discontinued. Noncompliance with medications was again noted    12/2019: Hospitalized for atrial flutter. KADEEM showed severe biventricular  Systolic dysfunction. He had cardioversion and was again discharged on 50 mg Toprol-XL twice daily. 9/19/20 : Seen in the emergency room for dizziness, lightheadedness and unsteadiness. He was found to be in rapid atrial flutter. He received lidocaine in the emergency room; diltiazem drip was started subsequently    He has a history of schizophrenia, tachycardia induced cardiomyopathy. he complains of dyspnea on exertion and palpitations and denies syncope. Patient Active Problem List    Diagnosis Date Noted    SVT [facility]supraventricular tachycardia) ([facility] [address] 09/19/2020    Chronic systolic heart failure (HCC)     Schizoaffective disorder, chronic condition ([address]        Past Medical History:   Diagnosis Date    CAD (coronary artery disease)     CHF (congestive heart failure) ([address]     Pneumonia     Schizo affective schizophrenia ([address]        No past surgical history on file.     Family History   Problem Relation Age of Onset  Other Mother     Kidney Disease Father     Other Father        Social History     Tobacco Use    Smoking status: Former Smoker     Types: Cigars     Last attempt to quit: 2019     Years since quittin.7    Smokeless tobacco: Never Used    Tobacco comment: about 3 cigars a month   Substance Use Topics    Alcohol use: Not Currently     Comment: havs not drank in 2yrs       Current Facility-Administered Medications   Medication Dose Route Frequency Provider Last Rate Last Dose    dilTIAZem 100 mg in dextrose 5 % 100 mL infusion ([facility])  5 mg/hr Intravenous Continuous Polo Hartman MD 15 mL/hr at 20 0502 15 mg/hr at 20 0502    paliperidone (INVEGA) extended release tablet 6 mg  6 mg Oral QAM Polo Hartman MD        QUEtiapine (SEROQUEL) tablet 50 mg  50 mg Oral Nightly Polo Hartman MD   50 mg at 20    rivaroxaban (XARELTO) tablet 20 mg  20 mg Oral Dinner Polo Hartman MD   20 mg at 20    sodium chloride flush 0.9 % injection 10 mL  10 mL Intravenous 2 times per day Polo Hartman MD        sodium chloride flush 0.9 % injection 10 mL  10 mL Intravenous PRN Polo Hartman MD        acetaminophen (TYLENOL) tablet 650 mg  650 mg Oral Q6H PRN Polo Hartman MD        Or    acetaminophen (TYLENOL) suppository 650 mg  650 mg Rectal Q6H PRN Polo Hartman MD        polyethylene glycol (GLYCOLAX) packet 17 g  17 g Oral Daily PRN Polo Hartman MD        promethazine (PHENERGAN) tablet 12.5 mg  12.5 mg Oral Q6H PRN Polo Hartman MD        Or    ondansetron (ZOFRAN) injection 4 mg  4 mg Intravenous Q6H PRN Polo Hartman MD        perflutren lipid microspheres (DEFINITY) injection 1.65 mg  1.5 mL Intravenous ONCE PRN Polo Hartman MD            No Known Allergies    ROS:   Constitutional: Negative for fever, activity change and appetite change. HENT: Negative for epistaxis, difficulty swallowing.    Eyes: Negative for blurred vision or double vision. Respiratory: pos for cough, chest tightness, shortness of breath and wheezing. Cardiovascular: Negative for chest pain, palpitations and leg swelling. Gastrointestinal: Negative for abdominal pain, heartburn, or blood in stool. Genitourinary: Negative for hematuria, burning or frequency. Musculoskeletal: Negative for myalgias, stiffness, or swelling. Skin: Negative for rash, pain, or lumps. Neurological: Negative for syncope, seizures, or headaches. Psychiatric/Behavioral: Negative for confusion and agitation. The patient is not nervous/anxious. PHYSICAL EXAM:  Vitals:    09/19/20 2019 09/19/20 2100 09/20/20 0000 09/20/20 0400   BP: (!) 116/90 138/81 (!) 108/55 116/66   Pulse: 103 98 66 107   Resp: 16 18 16 16   Temp: 97.6 °F (36.4 °C) 97 °F (36.1 °C) 98.3 °F (36.8 °C) 98.1 °F (36.7 °C)   TempSrc:  Temporal Oral Oral   SpO2: 99% 98% 98% 98%   Weight:  207 lb 6 oz (94.1 kg)     Height:  6' 2\" (1.88 m)       Constitutional: Oriented to person, place, and time. Well-developed and cooperative. Head: Normocephalic and atraumatic. Eyes: Conjunctivae are normal.   Neck:  no JVD present. Cardiovascular: S1 normal, S2 normal and intact distal pulses. A regular rhythm present. PMI is not displaced. Pulmonary/Chest: Effort normal and breath sounds normal. No respiratory distress. Abdominal: Soft. Normal appearance and bowel sounds are normal.    Musculoskeletal: Normal range of motion present, no muscle weakness. Neurological: Alert and oriented to person, place, and time. No focal findings on my exam  Skin: Skin is warm and dry. No bruising, no ecchymosis and no rash noted. Extremity: No visible clubbing or cyanosis. No edema. Psychiatric: Normal mood and affect.  Thought content normal.     Pertinent Labs:  CBC:   Recent Labs     09/19/20  1645 09/20/20  0605   WBC 7.3 7.1   HGB 12.7 13.1   HCT 39.9 40.8    182     BMP:  Recent Labs     09/19/20  1645 09/20/20  0605    142   K 4.5 4.2    105   CO2 23 23   BUN 11 15   CREATININE 1.0 1.1   CALCIUM 9.1 9.2     ABGs: No results found for: PHART, PO2ART, COT7YKQ  INR:   Recent Labs     20  0605   INR 2.1     BNP: No results for input(s): BNP in the last 72 hours. TSH:   Lab Results   Component Value Date    TSH 1.460 2020      Cardiac Injury Profile:   Recent Labs     20  1645   TROPONINI <0.01      Lipid Profile:   Lab Results   Component Value Date    TRIG 149 2018    HDL 38 2018    LDLCALC 99 2018    CHOL 167 2018      Hemoglobin A1C: No components found for: HGBA1C   Xray:   XR CHEST PORTABLE   Final Result   No acute cardiopulmonary pathology. Pertinent Cardiac Testin/2019 KADEEM    Left Ventricle   Mildly dilated left ventricle. Severely reduced left ventricular systolic function. Ejection fraction is visually estimated at 20-25%. Right Ventricle   Normal size right ventricle cavity. Right ventricle global systolic function is moderately reduced . Left Atrium   Moderately dilated left atrium. There was evidence of spontaneous echocardiographic contrast in the left   atrium. There is no left atrial appendage thrombus. Agitated saline injected for shunt evaluation. No evidence of patent foramen ovale. Right Atrium   Moderately enlarged right atrium size. Spontaneous echocardiographic contrast present      Mitral Valve   Structurally normal mitral valve. Mild to moderate mitral regurgitation is present. Tricuspid Valve   The tricuspid valve appears structurally normal.   Mild tricuspid regurgitation. Aortic Valve   Structurally normal aortic valve. Pulmonic Valve   The pulmonic valve was not well visualized. Pericardial Effusion   No pericardial effusion. Aorta   Aortic root dimension within normal limits. No significant plaque noted in the descending aorta.       Conclusions      Summary   Mildly dilated left ventricle. Severely reduced left ventricular systolic function. Moderately dilated left atrium. There was evidence of spontaneous echocardiographic contrast in the left   atrium. Right ventricle global systolic function is moderately reduced . Moderately enlarged right atrium size. Spontaneous echocardiographic contrast present   Mild to moderate mitral regurgitation is present. Mild tricuspid regurgitation. Telemetry9/20/2020: Atrial flutter with RVR: variable block    ECG 9/20/2020: Atrial flutter with variable block, nonspecific ST-T wave changes, ventricular rate 117    I have independently reviewed all of the ECGs and rhythm strips per above. I have personally reviewed the laboratory, cardiac diagnostic and radiographic testing as outlined above. I have reviewed previous records noted in [address] [facility]. Impression:    1. Atrial Flutter  - Diagnosed 11/2016 in the setting setting of alcohol abuse, decompensated heart failure, LVEF 30% at that time. He was cardioverted on 11/16/16   -1/25/17 - WCD shock for atrial flutter, LVEF 55%  - 12/2019- KADEEM/DCCV for atrial flutter, LVEF 25%  - [id number] Score: 1 for CHF on xarelto  - No ischemic evaluation on file  - Known severe cardiomyopathy down to 25% but recovered to 55% in 2017. Need to obtain repeat echo and pursue ischemic evaluation  - KADEEM/Cardioversion can be done after above. Will probably hold off on anti-arrhythmic given that compliance has been an issue. -Appears to be typical flutter on EKG  -I discussed options of managing his arrhythmia which include EP study with atrial flutter ablation versus antiarrhythmic drug therapy but at this time he declines any invasive approach for management of his arrhythmia would rather try medications.  -There has been no follow-up on file since last hospitalization in December 2019.   If he can follow-up appropriately in outpatient clinic, we can consider addition of sotalol or amiodarone  -In the interim continue rate control with IV Cardizem, I will add beta-blockers    2. Cardiomyopathy  - Likely tachycardia induced based on recovery in the past however, he needs an ischemic evaluation  -Repeat transthoracic echo to evaluate LVEF and consult cardiology  -He denies any alcohol use currently but there has been history of heavy alcohol use in the past    3. Schizo-affective disorder    Recommendations:    1. Consult Cardiology, he will need an ischemic evaluation at some point  2. Repeat TTE to evaluate LVEF  3. Would not use anti-arrhythmic therapy due to lack of follow up. We can discuss this as an outpatient if he follows up appropriately  4. Consider KADEEM/DCCV prior to discharge  5. Lifevest at discharge if LVEF <35%      I have spent a total of 110  minutes with the patient and his/her family reviewing the above stated recommendations. A total of >50% of that time involved face-to-face time providing counseling and or coordination of care with the other providers. Thank you for allowing me to participate in your patient's care. [facility] Cardiac Electrophysiology  . [facility]    NOTE: This report was transcribed using voice recognition software. Every effort was made to ensure accuracy; however, inadvertent computerized transcription errors may be present.

## 2020-09-20 NOTE — PROGRESS NOTES
EP spoke to Catherine and patient ok for diet and will be kept NPO after MN for a DCCV tomorrow. Also, made aware of 's-140's. OK to continue IV Cardizem at current rate.

## 2020-09-21 ENCOUNTER — APPOINTMENT (OUTPATIENT)
Dept: CARDIAC CATH/INVASIVE PROCEDURES | Age: 60
DRG: 201 | End: 2020-09-21
Payer: COMMERCIAL

## 2020-09-21 ENCOUNTER — ANESTHESIA EVENT (OUTPATIENT)
Dept: CARDIAC CATH/INVASIVE PROCEDURES | Age: 60
DRG: 201 | End: 2020-09-21
Payer: COMMERCIAL

## 2020-09-21 ENCOUNTER — TELEPHONE (OUTPATIENT)
Dept: NON INVASIVE DIAGNOSTICS | Age: 60
End: 2020-09-21

## 2020-09-21 ENCOUNTER — ANESTHESIA (OUTPATIENT)
Dept: CARDIAC CATH/INVASIVE PROCEDURES | Age: 60
DRG: 201 | End: 2020-09-21
Payer: COMMERCIAL

## 2020-09-21 VITALS
SYSTOLIC BLOOD PRESSURE: 89 MMHG | DIASTOLIC BLOOD PRESSURE: 72 MMHG | OXYGEN SATURATION: 99 % | RESPIRATION RATE: 19 BRPM

## 2020-09-21 LAB
ANION GAP SERPL CALCULATED.3IONS-SCNC: 14 MMOL/L (ref 7–16)
BASOPHILS ABSOLUTE: 0.03 E9/L (ref 0–0.2)
BASOPHILS RELATIVE PERCENT: 0.4 % (ref 0–2)
BUN BLDV-MCNC: 17 MG/DL (ref 8–23)
CALCIUM SERPL-MCNC: 9 MG/DL (ref 8.6–10.2)
CHLORIDE BLD-SCNC: 103 MMOL/L (ref 98–107)
CO2: 24 MMOL/L (ref 22–29)
CREAT SERPL-MCNC: 1.2 MG/DL (ref 0.7–1.2)
EKG ATRIAL RATE: 278 BPM
EKG P AXIS: -85 DEGREES
EKG Q-T INTERVAL: 282 MS
EKG QRS DURATION: 84 MS
EKG QTC CALCULATION (BAZETT): 393 MS
EKG R AXIS: 5 DEGREES
EKG T AXIS: 70 DEGREES
EKG VENTRICULAR RATE: 117 BPM
EOSINOPHILS ABSOLUTE: 0.14 E9/L (ref 0.05–0.5)
EOSINOPHILS RELATIVE PERCENT: 1.8 % (ref 0–6)
GFR AFRICAN AMERICAN: >60
GFR NON-AFRICAN AMERICAN: >60 ML/MIN/1.73
GLUCOSE BLD-MCNC: 92 MG/DL (ref 74–99)
HCT VFR BLD CALC: 40.3 % (ref 37–54)
HEMOGLOBIN: 13.1 G/DL (ref 12.5–16.5)
IMMATURE GRANULOCYTES #: 0.03 E9/L
IMMATURE GRANULOCYTES %: 0.4 % (ref 0–5)
LV EF: 40 %
LVEF MODALITY: NORMAL
LYMPHOCYTES ABSOLUTE: 2.41 E9/L (ref 1.5–4)
LYMPHOCYTES RELATIVE PERCENT: 31.6 % (ref 20–42)
MAGNESIUM: 1.9 MG/DL (ref 1.6–2.6)
MCH RBC QN AUTO: 29.6 PG (ref 26–35)
MCHC RBC AUTO-ENTMCNC: 32.5 % (ref 32–34.5)
MCV RBC AUTO: 91.2 FL (ref 80–99.9)
MONOCYTES ABSOLUTE: 0.74 E9/L (ref 0.1–0.95)
MONOCYTES RELATIVE PERCENT: 9.7 % (ref 2–12)
NEUTROPHILS ABSOLUTE: 4.27 E9/L (ref 1.8–7.3)
NEUTROPHILS RELATIVE PERCENT: 56.1 % (ref 43–80)
PDW BLD-RTO: 13.2 FL (ref 11.5–15)
PHOSPHORUS: 3.7 MG/DL (ref 2.5–4.5)
PLATELET # BLD: 175 E9/L (ref 130–450)
PMV BLD AUTO: 10.9 FL (ref 7–12)
POTASSIUM SERPL-SCNC: 4 MMOL/L (ref 3.5–5)
RBC # BLD: 4.42 E12/L (ref 3.8–5.8)
SODIUM BLD-SCNC: 141 MMOL/L (ref 132–146)
WBC # BLD: 7.6 E9/L (ref 4.5–11.5)

## 2020-09-21 PROCEDURE — 93010 ELECTROCARDIOGRAM REPORT: CPT | Performed by: INTERNAL MEDICINE

## 2020-09-21 PROCEDURE — 93312 ECHO TRANSESOPHAGEAL: CPT | Performed by: INTERNAL MEDICINE

## 2020-09-21 PROCEDURE — 6360000002 HC RX W HCPCS: Performed by: NURSE ANESTHETIST, CERTIFIED REGISTERED

## 2020-09-21 PROCEDURE — 3700000000 HC ANESTHESIA ATTENDED CARE

## 2020-09-21 PROCEDURE — 6370000000 HC RX 637 (ALT 250 FOR IP): Performed by: INTERNAL MEDICINE

## 2020-09-21 PROCEDURE — 36415 COLL VENOUS BLD VENIPUNCTURE: CPT

## 2020-09-21 PROCEDURE — 93005 ELECTROCARDIOGRAM TRACING: CPT | Performed by: INTERNAL MEDICINE

## 2020-09-21 PROCEDURE — 2580000003 HC RX 258: Performed by: INTERNAL MEDICINE

## 2020-09-21 PROCEDURE — 2709999900 HC NON-CHARGEABLE SUPPLY

## 2020-09-21 PROCEDURE — 93325 DOPPLER ECHO COLOR FLOW MAPG: CPT

## 2020-09-21 PROCEDURE — 99231 SBSQ HOSP IP/OBS SF/LOW 25: CPT | Performed by: STUDENT IN AN ORGANIZED HEALTH CARE EDUCATION/TRAINING PROGRAM

## 2020-09-21 PROCEDURE — 99233 SBSQ HOSP IP/OBS HIGH 50: CPT | Performed by: INTERNAL MEDICINE

## 2020-09-21 PROCEDURE — 97161 PT EVAL LOW COMPLEX 20 MIN: CPT

## 2020-09-21 PROCEDURE — 83735 ASSAY OF MAGNESIUM: CPT

## 2020-09-21 PROCEDURE — 2500000003 HC RX 250 WO HCPCS: Performed by: INTERNAL MEDICINE

## 2020-09-21 PROCEDURE — 93325 DOPPLER ECHO COLOR FLOW MAPG: CPT | Performed by: INTERNAL MEDICINE

## 2020-09-21 PROCEDURE — 3700000001 HC ADD 15 MINUTES (ANESTHESIA)

## 2020-09-21 PROCEDURE — 99232 SBSQ HOSP IP/OBS MODERATE 35: CPT | Performed by: INTERNAL MEDICINE

## 2020-09-21 PROCEDURE — 5A2204Z RESTORATION OF CARDIAC RHYTHM, SINGLE: ICD-10-PCS | Performed by: INTERNAL MEDICINE

## 2020-09-21 PROCEDURE — 85025 COMPLETE CBC W/AUTO DIFF WBC: CPT

## 2020-09-21 PROCEDURE — 93321 DOPPLER ECHO F-UP/LMTD STD: CPT

## 2020-09-21 PROCEDURE — B24BZZ4 ULTRASONOGRAPHY OF HEART WITH AORTA, TRANSESOPHAGEAL: ICD-10-PCS | Performed by: INTERNAL MEDICINE

## 2020-09-21 PROCEDURE — 97530 THERAPEUTIC ACTIVITIES: CPT

## 2020-09-21 PROCEDURE — 92960 CARDIOVERSION ELECTRIC EXT: CPT | Performed by: INTERNAL MEDICINE

## 2020-09-21 PROCEDURE — 92960 CARDIOVERSION ELECTRIC EXT: CPT

## 2020-09-21 PROCEDURE — 93320 DOPPLER ECHO COMPLETE: CPT | Performed by: INTERNAL MEDICINE

## 2020-09-21 PROCEDURE — 80048 BASIC METABOLIC PNL TOTAL CA: CPT

## 2020-09-21 PROCEDURE — 2580000003 HC RX 258: Performed by: NURSE ANESTHETIST, CERTIFIED REGISTERED

## 2020-09-21 PROCEDURE — 84100 ASSAY OF PHOSPHORUS: CPT

## 2020-09-21 PROCEDURE — 93312 ECHO TRANSESOPHAGEAL: CPT

## 2020-09-21 PROCEDURE — 2140000000 HC CCU INTERMEDIATE R&B

## 2020-09-21 RX ORDER — PROPOFOL 10 MG/ML
INJECTION, EMULSION INTRAVENOUS PRN
Status: DISCONTINUED | OUTPATIENT
Start: 2020-09-21 | End: 2020-09-21 | Stop reason: SDUPTHER

## 2020-09-21 RX ORDER — SODIUM CHLORIDE 9 MG/ML
INJECTION, SOLUTION INTRAVENOUS CONTINUOUS PRN
Status: DISCONTINUED | OUTPATIENT
Start: 2020-09-21 | End: 2020-09-21 | Stop reason: SDUPTHER

## 2020-09-21 RX ADMIN — METOPROLOL SUCCINATE 50 MG: 50 TABLET, EXTENDED RELEASE ORAL at 08:57

## 2020-09-21 RX ADMIN — PROPOFOL 150 MG: 10 INJECTION, EMULSION INTRAVENOUS at 16:10

## 2020-09-21 RX ADMIN — DEXTROSE MONOHYDRATE 15 MG/HR: 50 INJECTION, SOLUTION INTRAVENOUS at 03:29

## 2020-09-21 RX ADMIN — QUETIAPINE FUMARATE 50 MG: 25 TABLET ORAL at 20:19

## 2020-09-21 RX ADMIN — LISINOPRIL 2.5 MG: 5 TABLET ORAL at 08:57

## 2020-09-21 RX ADMIN — SODIUM CHLORIDE: 9 INJECTION, SOLUTION INTRAVENOUS at 16:00

## 2020-09-21 RX ADMIN — SODIUM CHLORIDE, PRESERVATIVE FREE 10 ML: 5 INJECTION INTRAVENOUS at 20:20

## 2020-09-21 RX ADMIN — PALIPERIDONE 6 MG: 6 TABLET, EXTENDED RELEASE ORAL at 08:57

## 2020-09-21 RX ADMIN — RIVAROXABAN 20 MG: 20 TABLET, FILM COATED ORAL at 17:31

## 2020-09-21 RX ADMIN — METOPROLOL SUCCINATE 50 MG: 50 TABLET, EXTENDED RELEASE ORAL at 20:19

## 2020-09-21 RX ADMIN — SODIUM CHLORIDE, PRESERVATIVE FREE 10 ML: 5 INJECTION INTRAVENOUS at 08:59

## 2020-09-21 ASSESSMENT — PAIN SCALES - GENERAL
PAINLEVEL_OUTOF10: 0

## 2020-09-21 NOTE — ANESTHESIA PRE PROCEDURE
Department of Anesthesiology  Preprocedure Note       Name:  Jigna Cordova   Age:  56 y.o.  :  1960                                          MRN:  [id number]         Date:  2020      Surgeon: Dr. Chase Rice    Procedure: KADEEM/DCCV    Medications prior to admission:   Prior to Admission medications    Medication Sig Start Date End Date Taking?  Authorizing Provider   ibuprofen (ADVIL;MOTRIN) 800 MG tablet Take 800 mg by mouth every 8 hours as needed for Pain   Yes Historical Provider, MD   Multiple Vitamins-Minerals (CERTAVITE SENIOR/ANTIOXIDANT) TABS Take 1 tablet by mouth every morning   Yes Historical Provider, MD   rivaroxaban (XARELTO) 20 MG TABS tablet Take 1 tablet by mouth Daily with supper 19  Yes Veronica Guy MD   lisinopril (PRINIVIL;ZESTRIL) 2.5 MG tablet Take 1 tablet by mouth daily 19  Yes Veronica Guy MD   metoprolol succinate (TOPROL XL) 50 MG extended release tablet Take 1 tablet by mouth 2 times daily 19  Yes Veronica Guy MD   QUEtiapine (SEROQUEL) 50 MG tablet Take 50 mg by mouth nightly   Yes Historical Provider, MD   paliperidone (INVEGA) 6 MG extended release tablet Take 6 mg by mouth every morning   Yes Historical Provider, MD       Current medications:    Current Facility-Administered Medications   Medication Dose Route Frequency Provider Last Rate Last Dose    lisinopril (PRINIVIL;ZESTRIL) tablet 2.5 mg  2.5 mg Oral Daily vAtar Ingram MD   2.5 mg at 20 0857    metoprolol succinate (TOPROL XL) extended release tablet 50 mg  50 mg Oral BID Avtar Ingram MD   50 mg at 20 0857    paliperidone (INVEGA) extended release tablet 6 mg  6 mg Oral QAM Yoanna Kumar MD   6 mg at 20 0857    QUEtiapine (SEROQUEL) tablet 50 mg  50 mg Oral Nightly Yoanna Kumar MD   50 mg at 20    rivaroxaban (XARELTO) tablet 20 mg  20 mg Oral Dinner Yoanna Kumar MD   20 mg at 20 1727    sodium chloride flush 0.9 % injection 10 mL  10 mL Intravenous 2 times per day Kelly Mercedes MD   10 mL at 20 0859    sodium chloride flush 0.9 % injection 10 mL  10 mL Intravenous PRN Kelly Mercedes MD        acetaminophen (TYLENOL) tablet 650 mg  650 mg Oral Q6H PRN Kelly Mercedes MD        Or   [employer] acetaminophen (TYLENOL) suppository 650 mg  650 mg Rectal Q6H PRN Kelly Mercedes MD        polyethylene glycol (GLYCOLAX) packet 17 g  17 g Oral Daily PRN Kelly Mercedes MD        promethazine (PHENERGAN) tablet 12.5 mg  12.5 mg Oral Q6H PRN Kelly Mercedes MD        Or    [facility] injection 4 mg  4 mg Intravenous Q6H PRN Kelly Mercedes MD        perflutren lipid microspheres (DEFINITY) injection 1.65 mg  1.5 mL Intravenous ONCE PRN Kelly Mercedes MD           Allergies:  No Known Allergies    Problem List:    Patient Active Problem List   Diagnosis Code    Schizoaffective disorder, chronic condition ([facility]) F25.8    Chronic systolic heart failure ([facility]) I50.22    SVT (supraventricular tachycardia) ([facility]) I47.1       Past Medical History:        Diagnosis Date    CAD (coronary artery disease)     [facility]estive [facility] failure) ([facility] [address]     Pneumonia     Schizo affective schizophrenia ([facility] [address]        Past Surgical History:  No past surgical history on file.     Social History:    Social History     Tobacco Use    Smoking status: Former Smoker     Types: Cigars     Last attempt to quit: 2019     Years since quittin.7    Smokeless tobacco: Never Used    Tobacco comment: about 3 cigars a month   Substance Use Topics    Alcohol use: Not Currently     Comment: havs not drank in 2yrs                                Counseling given: Not Answered  Comment: about 3 cigars a month      Vital Signs (Current):   Vitals:    20 0000 20 0400 20 0730 20 1139   BP: 105/71 (!) 95/53 105/76 106/66   Pulse: 69 66 96 70   Resp: 16 16 18 16   Temp: 36.7 °C (98 °F) 36.5 °C (97.7 °F) 36.7 °C (98.1 °F) 36.8 °C (98.2 °F)   TempSrc: Temporal  7:57 AM  HM[facility]PEAPM    QRS Duration  84  ms  Final  09/20/2020  7:57 AM  [facility]    Q-T Interval  282  ms  Final  09/20/2020  7:57 AM  [facility]APM    QTc Calculation (Bazett)  393  ms  Final  09/20/2020  7:57 AM  [facility]M    P Axis  -85  degrees  Final  09/20/2020  7:57 AM  [facility]APM    R Axis  5  degrees  Final  09/20/2020  7:57 AM  [facility]M    T Axis  70  degrees  Final  09/20/2020  7:57 AM  [facility]APM    Testing Performed By     Lab - [address]  Name  Director  Address  Valid Date Range    360-[facility]APM  [facility] MUSE  Unknown  Unknown  04/18/16 0721-Present    Narrative & Impression     Atrial flutter with variable AV block  Nonspecific ST and T wave abnormality -correlate clinically   Abnormal ECG  When compared with ECG of 19-SEP-2020 16:37,  Significant changes have occurred  Confirmed by Colonel Hdz ([id number]) on 9/21/2020 6:11:07 AM     12/10/19 ECHO    Findings      Left Ventricle   Mildly dilated left ventricle. Severely reduced left ventricular systolic function. Ejection fraction is visually estimated at 20-25%. Right Ventricle   Normal size right ventricle cavity. Right ventricle global systolic function is moderately reduced . Left Atrium   Moderately dilated left atrium. There was evidence of spontaneous echocardiographic contrast in the left   atrium. There is no left atrial appendage thrombus. Agitated saline injected for shunt evaluation. No evidence of patent foramen ovale. Right Atrium   Moderately enlarged right atrium size. Spontaneous echocardiographic contrast present      Mitral Valve   Structurally normal mitral valve. Mild to moderate mitral regurgitation is present. Tricuspid Valve   The tricuspid valve appears structurally normal.   Mild tricuspid regurgitation. Aortic Valve   Structurally normal aortic valve. Pulmonic Valve   The pulmonic valve was not well visualized. Pericardial Effusion   No pericardial effusion.       Aorta   Aortic root dimension within normal limits. No significant plaque noted in the descending aorta. Conclusions      Summary   Mildly dilated left ventricle. Severely reduced left ventricular systolic function. Moderately dilated left atrium. There was evidence of spontaneous echocardiographic contrast in the left   atrium. Right ventricle global systolic function is moderately reduced . Moderately enlarged right atrium size. Spontaneous echocardiographic contrast present   Mild to moderate mitral regurgitation is present. Mild tricuspid regurgitation. Signature      ----------------------------------------------------------------   Electronically signed by Rina Dow MD(Interpreting   physician) on 12/10/2019 02:00 PM      Anesthesia Evaluation  Patient summary reviewed and Nursing notes reviewed no history of anesthetic complications:   Airway: Mallampati: III  TM distance: >3 FB   Neck ROM: full  Mouth opening: > = 3 FB Dental:    (+) upper dentures      Pulmonary: breath sounds clear to auscultation                            ROS comment: Former smoker   Cardiovascular:    (+) hypertension:, CAD:, dysrhythmias: SVT and atrial flutter, CHF: systolic,       ECG reviewed  Rhythm: irregular    Echocardiogram reviewed         Beta Blocker:  Dose within 24 Hrs         Neuro/Psych:   (+) psychiatric history (Schizoaffective disorder, chronic condition):            GI/Hepatic/Renal: Neg GI/Hepatic/Renal ROS            Endo/Other:    (+) blood dyscrasia (xarelto pt): anticoagulation therapy:., .          Pt had no PAT visit       Abdominal:           Vascular: negative vascular ROS. Anesthesia Plan      MAC     ASA 3       Induction: intravenous. Anesthetic plan and risks discussed with patient. Plan discussed with attending.                   GWENDOLYN Rico - RADHA   9/21/2020

## 2020-09-21 NOTE — DISCHARGE INSTR - COC
Continuity of Care Form    Patient Name: Jorden Martinez   :  1960  MRN:  [id number]    Admit date:  2020  Discharge date:  20    Code Status Order: Full Code   Advance Directives:   [facility] Documentation       Date/Time Healthcare Directive Type of Healthcare Directive Copy in [facility] [address] Agent's Name Healthcare Agent's Phone Number    20 2100  No, patient does not have an advance directive for healthcare treatment -- -- -- -- --            Admitting Physician:  Yesenia Arvizu MD  PCP: No primary care provider on file. Discharging Nurse: BETY Bello, [facility] Unit/Room#: 0392/7466-Q  Discharging Unit Phone Number: [phone]    Emergency Contact:   Extended Emergency Contact Information  Primary Emergency Contact: Jaret Centeno  Address: [facility] Phone: [phone]  Mobile Phone: [phone]  Relation: Brother/Sister   needed? No  [facility] Phone: [phone]  Relation: Other   needed? No    Past Surgical History:  History reviewed. No pertinent surgical history. Immunization History: There is no immunization history for the selected administration types on file for this patient.     Active Problems:  Patient Active Problem List   Diagnosis Code    Schizoaffective disorder, chronic condition ([facility]) F25.8    Chronic systolic heart failure ([facility]) I50.22    SVT (supraventricular tachycardia) ([facility]) I47.1       Isolation/Infection:   Isolation            No Isolation          Patient Infection Status       None to display            Nurse Assessment:  Last Vital Signs: BP (!) 126/92   Pulse 138   Temp 98.2 °F (36.8 °C)   Resp 17   Ht 6' 2\" (1.88 m)   Wt 207 lb 6 oz (94.1 kg)   SpO2 98%   BMI 26.63 kg/m²     Last documented pain score (0-10 scale): Pain Level: 0  Last Weight:   Wt Readings from Last 1 Encounters:   20 207 lb 6 oz (94.1 kg)     Mental Status:  oriented, alert, coherent, logical, thought processes intact and able to concentrate and follow conversation    IV Access:  - None    Nursing Mobility/ADLs:  Walking   Independent  Transfer  Independent  [facility] Delivery   none    Wound Care Documentation and Therapy:  Wound 11/16/16 Other (Comment) Scrotum (Active)   Number of days: 1405        Elimination:  Continence:   · Bowel: Yes  · Bladder: Yes  Urinary Catheter: None   Colostomy/Ileostomy/Ileal Conduit: No       Date of Last BM: ***    Intake/Output Summary (Last 24 hours) at 9/21/2020 1523  Last data filed at 9/21/2020 1408  Gross per 24 hour   Intake 660 ml   Output 975 ml   Net -315 ml     I/O last 3 completed shifts: In: 65 [P.O.:420; I.V.:240]  Out: 975 [Urine:975]    Safety Concerns:     None    Impairments/Disabilities:      None    Nutrition Therapy:  Current Nutrition Therapy:   - Oral Diet:  General    Routes of Feeding: Oral  Liquids: No Restrictions  Daily Fluid Restriction: no  Last Modified Barium Swallow with Video (Video Swallowing Test): not done    Treatments at the Time of Hospital Discharge:   Respiratory Treatments: ***  Oxygen Therapy:  is not on home oxygen therapy.   Ventilator:    - No ventilator support    Rehab Therapies: {THERAPEUTIC INTERVENTION:4196149747}  Weight Bearing Status/Restrictions: No weight bearing restirctions  Other Medical Equipment (for information only, NOT a DME order):  {EQUIPMENT:597264834}  Other Treatments: ***    Patient's personal belongings (please select all that are sent with patient):  {[facility] Belongings:311107276:::0}    RN SIGNATURE:  Electronically signed by Raad Espino RN on 9/22/20 at 8:26 PM EDT    CASE MANAGEMENT/SOCIAL WORK SECTION    Inpatient Status Date: ***    Readmission Risk Assessment Score:  Readmission Risk              Risk of Unplanned Readmission:        9           Discharging to Facility/ Agency   · Name:   · Address:  · Phone:  · Fax:    Dialysis Facility (if applicable)   · Name:  · Address:  · Dialysis Schedule:  · Phone:  · Fax:    / signature: {Esignature:529422884:::0}    PHYSICIAN SECTION    Prognosis: {Prognosis:0751602641:::0}    Condition at Discharge: Gordo Khan Patient Condition:798597222:::0}    Rehab Potential (if transferring to Rehab): {Prognosis:8329408737:::0}    Recommended Labs or Other Treatments After Discharge: ***    Physician Certification: I certify the above information and transfer of Kristie Acevedo  is necessary for the continuing treatment of the diagnosis listed and that he requires {Admit to Appropriate Level of Care:17439:::0} for {GREATER/LESS:069200341} 30 days.      Update Admission H&P: {CHP DME Changes in HandP:296830676:::0}    PHYSICIAN SIGNATURE:  {Esignature:357331713:::0}

## 2020-09-21 NOTE — PROGRESS NOTES
Physical Therapy  Physical Therapy Initial Assessment     Name: Renzo Banda  : 1960  MRN: [id number]    Referring Provider:  Rina Mccracken MD    Date of Service: 2020    Evaluating PT:  Isael Michel PT, DPT. [id number]    Room #:  3614/7263-Y  Diagnosis:  SVT   Reason for admission:  Palpitations, light headedness, weakness   Precautions:  Falls  Pertinent PMHx: CHF, CAD, schizoaffective   Procedures: none  Equipment Recommendations:  Pt requesting wheelchair order with c/o difficulty ambulating extended distances 2/2 SOB and fatigue. SUBJECTIVE:  Pt lives in a group home which is 2 stories but only uses the 1st floor. Pt states there is a ramp to enter. Ambulating with no AD PTA. OBJECTIVE:   Initial Evaluation  Date:  Treatment   Short Term/ Long Term   Goals   AM-PAC 6 Clicks      Was pt agreeable to Eval/treatment? Yes      Does pt have pain? Denies      Bed Mobility  Rolling: NT  Supine to sit:  Independent  Sit to supine: Independent  Scooting: Independent  Independent    Transfers Sit to stand: SBA  Stand to sit: SBA  Stand pivot: NT  Independent    Ambulation    50 feet with no AD SBA    >300 feet with no AD independent    Stair negotiation: ascended and descended  NT  >3 steps with 1 rail Mod I    ROM BUE:  See OT eval   BLE:  WFL     Strength BUE:  See OT eval   BLE:  knee ext 5/5  Ankle DF 5/5  Increase by 1/3 MMT grade    Balance Sitting EOB:  Independent  Dynamic Standing:  SBA  Sitting EOB:  Independent  Dynamic Standing:  Independent     -Pt is A & O x 3  -Sensation:  unremarkable   -Edema:  unremarkable     Therapeutic Exercises:  functional activity     Patient education  Pt educated on safety, sequencing of transfers, and role of PT    Patient response to education:   Pt verbalized understanding Pt demonstrated skill Pt requires further education in this area   Yes  Yes  Yes      ASSESSMENT:    Comments:  Pt received supine in bed and agreeable to PT session   Pt able to complete all transfers without hands on assistance. No difficulty with bed mobility. Did require second attempt to stand from bedside d/t minor LOB posterior causing pt to return to sitting position. HR monitored with activity and was sporadic ranging from 100-140 BPM. Pt with no complaints but did request a wheelchair at [place] to assist with energy conservation stating he fatigues quickly and walking becomes difficulty. Pt with all needs met and call light in reach. Pt would benefit from continued PT POC to address functional deficits described above. Treatment:  Patient practiced and was instructed in the following treatment:     Patient education provided continuously throughout session for sequencing, safety maintenance, and improving any deficits found during the evaluation.  Bed mobility training - pt given verbal and tactile cues to facilitate proper sequencing and safety during rolling and supine>sit   STS and pivot transfer training - pt educated on proper hand and foot placement, safety and sequencing to safely complete sit<>stand   Gait training- pt was given verbal and tactile cues to facilitate normal gait speed with improved endurance during ambulation as well as provided with physical assistance to complete task. Pt's/ family goals   1. None stated     Patient and or family understand(s) diagnosis, prognosis, and plan of care. Yes     PLAN:    Current Treatment Recommendations   [] Strengthening     [] ROM   [x] Balance Training   [x] Endurance Training   [x] Transfer Training   [x] Gait Training   [] Stair Training   [] Positioning   [] Safety and Education Training   [] Patient/Caregiver Education   [] HEP  [] Other     Frequency of treatments: 2-5x/week x 1-2 weeks.     Time in  0725  Time out  0740    Total Treatment Time 8 minutes     Evaluation Time includes thorough review of current medical information, gathering information on past medical history/social history and prior level of function, completion of standardized testing/informal observation of tasks, assessment of data and education on plan of care and goals.     CPT codes:  [x] Low Complexity PT evaluation 40334  [] Moderate Complexity PT evaluation 28716  [] High Complexity PT evaluation 10716  [] PT Re-evaluation 61359  [] Gait training 59483 - minutes  [] Manual therapy 48262 - minutes  [x] Therapeutic activities 43944 8 minutes  [] Therapeutic exercises 29542 - minutes  [] Neuromuscular reeducation 70688 - minutes     Monica Sinclair, PT, DPT  [id number]

## 2020-09-21 NOTE — PROGRESS NOTES
[facility]  Internal Medicine Residency Program  Progress Note - House Team 2    Patient:  Lulú Bales 56 y.o. male MRN: [id number]     Date of Service: 9/21/2020     CC: lightheadedness, unsteadiness, palpitations, near-syncopal episode  Overnight events: none    Subjective     Mr. Mare Xiong is a 56year-old [facility] American male with a past medical history of Afib, HFrEF, schizoaffective disorder, CAD, and HTN who presented to the ED 2 days ago (9/19/20) after an episode of lightheadedness, near-syncope, and palpitation while ambulating in the group home where he stays. He denies any falls during the 30-sec episode. He reported the incident to the supervisors in the group home, and EMS brought him to the ED. At the ED, his heart rate was in the 200s and EKG showed Aflutter. Cardizem drip was begun and he was transferred to the floors. The patient also has a history of HFrEF. In 2016, his EF was 30%. He was fitted for a life vest. A few weeks later, he was to undergo ICD placement. EF at the time was 55%, so no ICD was placed and life vest was discontinued. In 2019, he experienced a similar syncopal episode. EF at the time was 25% and he was not on goal-directed medical therapy. Today, he denies chest pain, SOB, palpitations, sweating, dizziness, and fatigue. He ambulated this morning with PT and experienced continued lightheadedness. He also reports some malaise and extremity weakness, but this seems to be chronic for him. He is a former smoker, but quit in December 2019. He has not used alcohol in 2 years. He states he has been compliant with all his medications and gets them administered to him at the group home.       Objective     Physical Exam:  · Vitals: /66   Pulse 70   Temp 98.2 °F (36.8 °C) (Oral)   Resp 16   Ht 6' 2\" (1.88 m)   Wt 207 lb 6 oz (94.1 kg)   SpO2 98%   BMI 26.63 kg/m²      · General Appearance: alert, appears stated age, cooperative and no

## 2020-09-21 NOTE — PROCEDURES
PROCEDURE NOTE    Attending Cardiologist: Cr Vance MD  Primary Cardiologist: Cleo Wynn MD  Consulting Electrophysiologist: Jigna Paz DO  Primary Electrophysiologist: Prerna Nesbitt MD    Date of Service: 9/21/2020    Procedure: Transesophageal Echocardiogram and Direct Current Cardioversion    Indication: Atrial flutter    Anticoagulant: Rivaroxaban    KADEEM: No left atrial appendage thrombus    Antiarrhythmic drug(s): Metoprolol    Description of procedure:  Patient presented in a fasting and well hydrated state. Informed consent obtained from patient. Risks, benefits and alternatives to KADEEM and DC cardioversion were explained to the patient in detail, and the patient acknowledged understanding. Cardiac rhythm, arterial oxygen saturation, and blood pressure were continuously monitored. Deep sedation with propofol administered by the Department of Anesthesiology. KADEEM was performed and there was no evidence of LA or JOSE ANGEL thrombus. See full KADEEM report in Epic. After removal of the probe and verification of adequate sedation, direct current cardioversion was performed as described:    Patch placement: Anterior/posterior  Energy: 50 Joules, synchronized  Number of shocks: 1  Outcome: Sinus rhythm  Complications: None    Impression:  Successful KADEEM-guided DC cardioversion of atrial flutter to normal sinus rhythm.     Cr Vance MD, [facility]

## 2020-09-21 NOTE — TELEPHONE ENCOUNTER
----- Message from Shon Melo sent at 9/21/2020  1:43 PM EDT -----    ----- Message -----  From: GWENDOLYN Austin CNP  Sent: 9/21/2020  11:28 AM EDT  To: Sharif Miller was seen in patient by Dr. Conor Galloway and will need a follow up EKG in one week and office follow up in one month.  Thanks

## 2020-09-21 NOTE — PROGRESS NOTES
[facility]  Internal Medicine Residency Program  Progress Note - House Team 1    Patient:  Eladio Favorite 56 y.o. male MRN: [id number]     Date of Service: 9/21/2020     CC: palpitations, lightheadedness and unsteadiness  Overnight events: None  Days since admission: 3    Subjective     Patient was seen at bed side in the am.   awake alert and respond to questions appropriately. no new complaints or events overnight. HR ranges between 144-66, bp ranges /53-71 overnight  Denies chest pain or sob. Telemetry shows a flutter    Objective     Physical Exam:  · Vitals: /66   Pulse 70   Temp 98.2 °F (36.8 °C) (Oral)   Resp 16   Ht 6' 2\" (1.88 m)   Wt 207 lb 6 oz (94.1 kg)   SpO2 98%   BMI 26.63 kg/m²     I & O - 24hr: I/O this shift: In: 0   · Out: 600 [Urine:600]   · General Appearance: alert and oriented to person, place and time, well developed and well- nourished, in no acute distress. Cooperative -American male. Does not appear to be in any acute distress.   · Skin: warm and dry, no rash or erythema  · Head: normocephalic and atraumatic  · Eyes: pupils equal, round, and reactive to light, extraocular eye movements intact, conjunctivae normal  · ENT: tympanic membrane, external ear and ear canal normal bilaterally, nose without deformity, nasal mucosa and turbinates normal without polyps  · Neck: supple and non-tender without mass, no thyromegaly or thyroid nodules, no cervical lymphadenopathy  · Pulmonary/Chest: clear to auscultation bilaterally- no wheezes, rales or rhonchi, normal air movement, no respiratory distress  · Cardiovascular: irregular rate, irregular rhythm, normal S1 and S2, no murmurs, rubs, clicks, or gallops, distal pulses intact, no carotid bruits  · Abdomen: soft, non-tender, non-distended, normal bowel sounds, no masses or organomegaly  · Extremities: no cyanosis, clubbing or edema  · Musculoskeletal: normal range of motion, no joint swelling, deformity or tenderness  · Neurologic: reflexes normal and symmetric, no cranial nerve deficit, gait, coordination and speech normal.  Patient's right arm with slight tremors ( chronic). Equal strength throughout. Subject  Pertinent Labs & Imaging Studies   kathy  CBC with Differential:    Lab Results   Component Value Date    WBC 7.6 09/21/2020    RBC 4.42 09/21/2020    HGB 13.1 09/21/2020    HCT 40.3 09/21/2020     09/21/2020    MCV 91.2 09/21/2020    MCH 29.6 09/21/2020    MCHC 32.5 09/21/2020    RDW 13.2 09/21/2020    NRBC 0.0 11/21/2016    LYMPHOPCT 31.6 09/21/2020    MONOPCT 9.7 09/21/2020    BASOPCT 0.4 09/21/2020    MONOSABS 0.74 09/21/2020    LYMPHSABS 2.41 09/21/2020    EOSABS 0.14 09/21/2020    BASOSABS 0.03 09/21/2020     CMP:    Lab Results   Component Value Date     09/21/2020    K 4.0 09/21/2020    K 4.4 12/07/2019     09/21/2020    CO2 24 09/21/2020    BUN 17 09/21/2020    CREATININE 1.2 09/21/2020    GFRAA >60 09/21/2020    LABGLOM >60 09/21/2020    GLUCOSE 92 09/21/2020    PROT 7.3 09/19/2020    LABALBU 4.0 09/19/2020    CALCIUM 9.0 09/21/2020    BILITOT 0.4 09/19/2020    ALKPHOS 53 09/19/2020    AST 22 09/19/2020    ALT 18 09/19/2020     EKG on admission: A flutter 2:1      Resident's Assessment and Plan     Alcira Berrios is a 56 y.o. male past medical history significant for atrial fibrillation s/p [facility] and an episode of cardiac defibrillation d/t VT (on metoprolol and Xarelto 20 mg), HFrEF (EF 25% in 2019), CAD, schizophrenia on quetiapine and paliperidone. 1.  Symptomatic palpitations likely secondary to atrial flutter 2:1  EKG in ED appears to be a flutter 2:1. Continue to monitor on telemetry. Electrolytes within normal limits. CBC within normal limits INR 1.3. PTT 14. 6. proBNP 588.   T4 and TSH normal  Still a flutter with variable AV block  D/c diltiazem drip. restart metoprolol   EP Dr. Cali Marshall will perform KADEEM and CV today on the patient, [place] will be discussed if LVEF is reduced with patient, f/u EKG in one week     2. Hx of Atrial flutter/atrial fibrillation  Patient is on Xarelto 20 mg daily. Pt [id number] 1. Continue Xarelto. D/c diltiazem drip. restart metoprolol     3. HFrEF likely 2/2 nonischemic cardiomyopathy? November 2016 patient was noted to have LVD with an EF of 30% and moderate to severe MR on KADEEM and he underwent [facility] and was fitted for a LifeVest.    Few weeks later he was properly shock for VT and went to the hospital for admission for ICD, however at that time it was noted his EF was improved to 55% and ICD was not placed. Patient had symptoms of syncope in 2019 his EF was found to be 25%. He is currently not on goal directed medical therapy. KADEEM today  Recommend starting goal directed medical therapy when patient stable. Mariangel Boyd will be discussed if LVEF is reduced with patient per EP Dr. Mary Cordova note    4. HTN  Restart patient home lisinopril. Blood pressure is still currently borderline normal.      5.  Pre-syncopal episode likely secondary to atrial flutter 2:1  Experienced a presyncopal episode while at the group home which required him to hold onto the wall. He denies any trauma to his head or any fall. Denies any confusion. Alert and oriented x3.     6.  Schizophrenia  Continue quetiapine and paliperidone. Patient QTC corrected 417. We will continue medications and continue to monitor QTC corrected.     7.  Generalized weakness and right hand tremor  This appears to be a chronic issue as per the patient. PT evaluation requested          PT/OT evaluation: PT requested   DVT prophylaxis/ GI prophylaxis: Xarelto/not indicated  Disposition: Continue inpatient mngt.        Joss Haley MD, PGY-1  Attending physician: Dr. Tiffanie Aguilera

## 2020-09-21 NOTE — CARE COORDINATION
Transition of care: Met with pt in room. Pt is from [facility] and pt wants to return there at discharge. States independent with ADLs. Cally Dominguezs at [facility] provides transportation. No DME. States a \"Dr. Miriam Doran" is his pcp. I called an left voicemail with Cally Dominguezs  at [facility] requesting a return call. PH#[phone]. I called and spoke with pt's brother, Richy Rcici, who is pt's legal guardian. #[phone]. Richy Keiry confirmed [employer] will be to return to [facility]. I asked Richy Ricci what was name of pt's pcp and his response was \"that info should already be on his chart. \" Sw/cm will follow     [phone] with Cally Ball from [facility]. PH#[phone]. Pt has lived there since 2017. History of schizophrenia. Cally Dominguezs would like nursing to call her with a report at discharge. They use [employer] for transportation. Pt's pcp is Dr. Fransico Faria and pharmacy is [facility] on [address].

## 2020-09-21 NOTE — PROGRESS NOTES
paliperidone  6 mg Oral QAM    QUEtiapine  50 mg Oral Nightly    rivaroxaban  20 mg Oral Dinner    sodium chloride flush  10 mL Intravenous 2 times per day       Infusion Medications:      PRN Medications:  sodium chloride flush, acetaminophen **OR** acetaminophen, polyethylene glycol, promethazine **OR** ondansetron, perflutren lipid microspheres    No Known Allergies    Wt Readings from Last 3 Encounters:   09/19/20 207 lb 6 oz (94.1 kg)   12/11/19 190 lb 6.4 oz (86.4 kg)   02/07/17 171 lb 12.8 oz (77.9 kg)     Temp Readings from Last 3 Encounters:   09/21/20 98.1 °F (36.7 °C)   12/11/19 98.5 °F (36.9 °C) (Temporal)   01/25/17 98.4 °F (36.9 °C) (Oral)     BP Readings from Last 3 Encounters:   09/21/20 105/76   12/11/19 108/62   12/10/19 114/82     Pulse Readings from Last 3 Encounters:   09/21/20 96   12/11/19 65   02/07/17 88         Intake/Output Summary (Last 24 hours) at 9/21/2020 1121  Last data filed at 9/21/2020 0629  Gross per 24 hour   Intake 780 ml   Output 775 ml   Net 5 ml       ROS:   Constitutional: Negative for fever, activity change and appetite change. HENT: Negative for epistaxis, difficulty swallowing. Eyes: Negative for blurred vision or double vision. Respiratory:  Negative for chest tightness, and wheezing  pos for cough, , shortness of breath. Cardiovascular: Negative for chest pain, palpitations and leg swelling. Gastrointestinal: Negative for abdominal pain, heartburn, or blood in stool. Genitourinary: Negative for hematuria, burning or frequency. Musculoskeletal: Negative for myalgias, stiffness, or swelling. Skin: Negative for rash, pain, or lumps. Neurological: Negative for syncope, seizures, or headaches. Psychiatric/Behavioral: Negative for confusion and agitation. The patient is not nervous/anxious.       PHYSICAL EXAM:  Vitals:    09/20/20 2001 09/21/20 0000 09/21/20 0400 09/21/20 0730   BP: 107/67 105/71 (!) 95/53 105/76   Pulse: 70 69 66 96   Resp: 16 16 16 18 Temp: 98.4 °F (36.9 °C) 98 °F (36.7 °C) 97.7 °F (36.5 °C) 98.1 °F (36.7 °C)   TempSrc: Oral Temporal Axillary    SpO2: 100% 97%  98%   Weight:       Height:        Constitutional: In NAD seen laying nearly flat in bed   Head: Normocephalic and atraumatic. Neck: No hepatojugular reflux and no JVD present  Cardiovascular: S1 , S2 present IRIR  Pulmonary/Chest:  No respiratory distress. Abdominal: Soft. Normal appearance   Musculoskeletal: Normal range of motion of all extremities  Neurological: Alert    Skin: Skin is warm and dry. Extremity:   No edema. Psychiatric: Normal mood and affect. Thought content normal.       Pertinent Labs:  CBC:   Recent Labs     20  1645 20  0605 20  0436   WBC 7.3 7.1 7.6   HGB 12.7 13.1 13.1   HCT 39.9 40.8 40.3    182 175     BMP:  Recent Labs     20  1645 20  0605 20  0436    142 141   K 4.5 4.2 4.0    105 103   CO2 23 23 24   BUN 11 15 17   CREATININE 1.0 1.1 1.2   CALCIUM 9.1 9.2 9.0     ABGs: No results found for: PHART, PO2ART, GUA4FOK  INR:   Recent Labs     20  0605   INR 2.1     BNP: No results for input(s): BNP in the last 72 hours. TSH:   Lab Results   Component Value Date    TSH 1.460 2020      Cardiac Injury Profile:   Recent Labs     20  164   TROPONINI <0.01      Lipid Profile:   Lab Results   Component Value Date    TRIG 149 2018    HDL 38 2018    LDLCALC 99 2018    CHOL 167 2018      Hemoglobin A1C: No components found for: HGBA1C   Xray: Xr Chest Portable    Result Date: 2020  Patient MRN: [id number] : 1960 Age:  56 years Gender: Male Order Date: 2020 5:00 PM Exam: XR CHEST PORTABLE Number of Images: 1 view Indication:  Chest pain Comparison: Anterior upright chest 2019 and 2 view upright chest 2017 Findings: The lungs are symmetrically expanded, and are clear. There is no evidence of pneumothorax or pleural effusion. Cardiovascular shadows are normal in appearance. There is no evidence of cardiac enlargement or decompensation. Skeletal structures show no evidence of acute pathology. Overlying EKG leads are present. No acute cardiopulmonary pathology.         Pertinent Cardiac Testin/2019 KADEEM     Left Ventricle   Mildly dilated left ventricle.   Severely reduced left ventricular systolic function.   Ejection fraction is visually estimated at 20-25%.      Right Ventricle   Normal size right ventricle cavity.   Right ventricle global systolic function is moderately reduced .      Left Atrium   Moderately dilated left atrium.   There was evidence of spontaneous echocardiographic contrast in the left   atrium.   There is no left atrial appendage thrombus.   Agitated saline injected for shunt evaluation.   No evidence of patent foramen ovale.      Right Atrium   Moderately enlarged right atrium size.   Spontaneous echocardiographic contrast present      Mitral Valve   Structurally normal mitral valve.   Mild to moderate mitral regurgitation is present.      Tricuspid Valve   The tricuspid valve appears structurally normal.   Mild tricuspid regurgitation.      Aortic Valve   Structurally normal aortic valve.      Pulmonic Valve   The pulmonic valve was not well visualized.      Pericardial Effusion   No pericardial effusion.      Aorta   Aortic root dimension within normal limits.   No significant plaque noted in the descending aorta.      Conclusions      Summary   Mildly dilated left ventricle.   Severely reduced left ventricular systolic function.   Moderately dilated left atrium.   There was evidence of spontaneous echocardiographic contrast in the left   atrium.   Right ventricle global systolic function is moderately reduced .   Moderately enlarged right atrium size.   Spontaneous echocardiographic contrast present   Mild to moderate mitral regurgitation is present.   Mild tricuspid regurgitation. Telemetry9/21/2020: Atrial flutter with variable AV block     ECG 9/20/2020: Atrial flutter with variable block, nonspecific ST-T wave changes, ventricular rate 117    I have independently reviewed all of the ECGs and rhythm strips per above. I have personally reviewed the laboratory, cardiac diagnostic and radiographic testing as outlined above. I have reviewed previous records noted in [address]. Impression:  1. Typical Atrial Flutter sp DCCV x 2 (1/25/17 and 12/2019)  -CHADSVASC = 1 (HF). Continue Xarelto 20 mg daily.   -Initially diagnosed in 2016.  -He has a history of TCM. Recommend rhythm control strategy. Due to history of noncompliance and polysubstance abuse, rhythm control limited to intermittent DCCV. Patient now lives in a group home and denies substance use in > 3 years. Recommend KADEEM/DCCV today. Consider RFA in future if he follows up as outpatient.  -Continue Toprol 50mg BID. 2. NYHA Class II HFrEF-improved/nonischemic (KADEEM 12/10/19: LVEF = 20-25%)  -KADEEM pending to reassess LVEF. Awaiting repeat echo to document LVEF   -Continue Toprol 50 mg every 12 hours and Lisinopril 2.5 mg daily. 3. History of Schizoaffective Disorder     4. Medical noncompliance     Recommendations:    1. KADEEM/CV today. 2. Will discuss Lifevest at discharge with patient if LVEF reduced on KADEEM. 3. Will await updated assessment of LVEF. 4. Will need EKG in one week and follow up with a provider in one month. Thank you for allowing me to participate in your patient's care.     Discussed with Dr. Jazmín Harper   Electronically signed by GWENDOLYN Morales CNP on 9/21/2020 at 11:46 AM   [facility]    Attending Supervising Physicians BRE [address]  I was present with the nurse practitioner during the history and exam. I discussed the findings and plans with the nurse practitioner and agree as documented in his note     Electronically signed by Savage Ch DO on 9/21/20 at 1:21 PM EDT

## 2020-09-21 NOTE — PROGRESS NOTES
INPATIENT CARDIOLOGY FOLLOW-UP    Name: Ladoris Lundborg    Age: 56 y.o. Date of Admission: 9/19/2020  4:28 PM    Date of Service: 9/21/2020    Primary Cardiologist: Dr. Avtar Zepeda, Dr Carmen Schuler    Chief Complaint: Follow-up for nonischemic cardiomyopathy, atrial flutter    Interim History:  Denies chest pain or shortness of breath. Feels okay. No palpitations. Currently atrial flutter with rates in the 130s. Seen by EP, plan for KADEEM cardioversion today.     Transthoracic echo also pending    Review of Systems:   Negative except as described above    Problem List:  Patient Active Problem List   Diagnosis    Schizoaffective disorder, chronic condition (HCC)    Chronic systolic heart failure (HCC)    SVT (supraventricular tachycardia) (HCC)       Current Medications:    Current Facility-Administered Medications:     lisinopril (PRINIVIL;ZESTRIL) tablet 2.5 mg, 2.5 mg, Oral, Daily, Wero Vickers MD, 2.5 mg at 09/21/20 0857    metoprolol succinate (TOPROL XL) extended release tablet 50 mg, 50 mg, Oral, BID, Wero Vickers MD, 50 mg at 09/21/20 0857    paliperidone (INVEGA) extended release tablet 6 mg, 6 mg, Oral, QAM, Genie Farrar MD, 6 mg at 09/21/20 0857    QUEtiapine (SEROQUEL) tablet 50 mg, 50 mg, Oral, Nightly, Genie Farrar MD, 50 mg at 09/20/20 2115    rivaroxaban (XARELTO) tablet 20 mg, 20 mg, Oral, Deidre Patton MD, 20 mg at 09/20/20 1727    sodium chloride flush 0.9 % injection 10 mL, 10 mL, Intravenous, 2 times per day, Genie Farrar MD, 10 mL at 09/21/20 0859    sodium chloride flush 0.9 % injection 10 mL, 10 mL, Intravenous, PRN, Genie Farrar MD    acetaminophen (TYLENOL) tablet 650 mg, 650 mg, Oral, Q6H PRN **OR** acetaminophen (TYLENOL) suppository 650 mg, 650 mg, Rectal, Q6H PRN, Genie Farrar MD    polyethylene glycol (GLYCOLAX) packet 17 g, 17 g, Oral, Daily PRN, Genie Farrar MD    promethazine (PHENERGAN) tablet 12.5 mg, 12.5 mg, Oral, Q6H PRN **OR** ondansetron (ZOFRAN) 89.9 91.2   MCH 28.9 28.9 29.6   MCHC 31.8* 32.1 32.5   RDW 13.0 13.1 13.2    182 175   MPV 10.3 10.8 10.9     Lab Results   Component Value Date    CKTOTAL 88 01/25/2017    CKMB 1.1 01/25/2017    TROPONINI <0.01 09/19/2020    TROPONINI <0.01 12/06/2019    TROPONINI <0.01 01/25/2017     Lab Results   Component Value Date    INR 2.1 09/20/2020    INR 1.3 01/24/2017    INR 1.1 01/09/2017    PROTIME 23.8 (H) 09/20/2020    PROTIME 14.6 (H) 01/24/2017    PROTIME 12.0 01/09/2017     Lab Results   Component Value Date    TSH 1.460 09/19/2020     No results found for: LABA1C  No results found for: EAG  Lab Results   Component Value Date    CHOL 167 01/16/2018    CHOL 90 11/15/2016     Lab Results   Component Value Date    TRIG 149 01/16/2018    TRIG 65 11/15/2016     Lab Results   Component Value Date    HDL 38 01/16/2018    HDL 29 11/15/2016     Lab Results   Component Value Date    LDLCALC 99 01/16/2018    LDLCALC 48 11/15/2016     Lab Results   Component Value Date    LABVLDL 30 01/16/2018    LABVLDL 13 11/15/2016     No results found for: CHOLHDLRATIO  Recent Labs     09/19/20  1645   PROBNP 588*       Cardiac Tests:    EKG: Atrial flutter 117 bpm with variable AV conduction. Normal axis normal intervals. Nonspecific ST-T wave changes. Telemetry: Atrial flutter in the 130s    Chest X-ray:   9/19/2020       Impression:         No acute cardiopulmonary pathology. Echocardiogram:   Dr. Chanel Hanks 12/10/2019. KDAEEM: Summary: Mildly dilated left ventricle. Severely reduced left ventricular systolic function, EF 87-44%. Moderately dilated left atrium. There was evidence of spontaneous echocardiographic contrast in the left atrium. Right ventricle global systolic function is moderately reduced. Moderately enlarged right atrium size. Spontaneous echocardiographic contrast present. Mild to moderate mitral regurgitation is present. Mild tricuspid regurgitation.     TTE 1/24/2017 TTE Dr Wilcox: No significant valve disease. Normal left ventricle size. Mild concentric left ventricular hypertrophy. No wall motion abnormalities. Indeterminate diastolic function. Ejection fraction is visually estimated at 55%. Study performed in sinus rhythm    KADEEM/DCCV(200J successful) 11/16/2016 Dr Wilcox: Moderately dilated left ventricle. Mild concentric left ventricular hypertrophy. Ejection fraction is visually estimated at 30%. Bi-atrial enlargement. There is no left atrial appendage thrombus. Moderately enlarged right ventricle cavity. Moderately reduced right ventricle systolic function. Moderate to severe mitral regurgitation is present. Left pleural effusion. Atrial flutter. Stress test:      Cardiac catheterization:     ----------------------------------------------------------------------------------------------------------------------------------------------------------------  IMPRESSION:  1. Nonischemic cardiomyopathy. Most recent EF 20 to 25% by KADEEM 12/2019. Previously 55% in 2017, recovered from 30% in 2016 after restoration of sinus rhythm. 2. Chronic HFrEF, appears fairly compensated. proBNP 500  3. Recurrent atrial flutter. History KADEEM/DCC 2016, again 12/2019  4. Questionable compliance with medical therapy  5. Hypertension  6. Schizoaffective disorder  7.  History of prior heavy alcohol abuse, quit 2017, history of tobacco abuse    RECOMMENDATIONS:     Continue guideline medical therapy with metoprolol succinate 50 twice daily, lisinopril 2.5 mg daily   If still severe LV dysfunction, consider transition to [address] [place] [address] EP input reviewed, KADEEM/cardioversion today   Continue rivaroxaban for stroke risk reduction   Aggressive risk factor modification   Consideration for ablation if has recurrence   Possible wearable cardioverter defibrillator pending EF assessment    Risks and benefits of KADEEM cardioversion explained to patient, including risk of esophageal perforation, CVA, failure to restore sinus rhythm, or recurrence of atrial flutter. He understands agrees to proceed. Daphine Bence MD, [facility]    NOTE: This report was transcribed using voice recognition software. Every effort was made to ensure accuracy; however, inadvertent computerized transcription errors may be present.

## 2020-09-22 LAB
ANION GAP SERPL CALCULATED.3IONS-SCNC: 10 MMOL/L (ref 7–16)
BASOPHILS ABSOLUTE: 0.04 E9/L (ref 0–0.2)
BASOPHILS RELATIVE PERCENT: 0.4 % (ref 0–2)
BUN BLDV-MCNC: 18 MG/DL (ref 8–23)
CALCIUM SERPL-MCNC: 9.2 MG/DL (ref 8.6–10.2)
CHLORIDE BLD-SCNC: 101 MMOL/L (ref 98–107)
CO2: 28 MMOL/L (ref 22–29)
CREAT SERPL-MCNC: 1.2 MG/DL (ref 0.7–1.2)
EKG ATRIAL RATE: 69 BPM
EKG ATRIAL RATE: 92 BPM
EKG P AXIS: 66 DEGREES
EKG P AXIS: 76 DEGREES
EKG P-R INTERVAL: 130 MS
EKG P-R INTERVAL: 140 MS
EKG Q-T INTERVAL: 364 MS
EKG Q-T INTERVAL: 418 MS
EKG QRS DURATION: 80 MS
EKG QRS DURATION: 94 MS
EKG QTC CALCULATION (BAZETT): 447 MS
EKG QTC CALCULATION (BAZETT): 450 MS
EKG R AXIS: 2 DEGREES
EKG R AXIS: 20 DEGREES
EKG T AXIS: 54 DEGREES
EKG T AXIS: 67 DEGREES
EKG VENTRICULAR RATE: 69 BPM
EKG VENTRICULAR RATE: 92 BPM
EOSINOPHILS ABSOLUTE: 0.19 E9/L (ref 0.05–0.5)
EOSINOPHILS RELATIVE PERCENT: 2.1 % (ref 0–6)
GFR AFRICAN AMERICAN: >60
GFR NON-AFRICAN AMERICAN: >60 ML/MIN/1.73
GLUCOSE BLD-MCNC: 98 MG/DL (ref 74–99)
HCT VFR BLD CALC: 39.1 % (ref 37–54)
HEMOGLOBIN: 12.3 G/DL (ref 12.5–16.5)
IMMATURE GRANULOCYTES #: 0.04 E9/L
IMMATURE GRANULOCYTES %: 0.4 % (ref 0–5)
LV EF: 53 %
LVEF MODALITY: NORMAL
LYMPHOCYTES ABSOLUTE: 2.65 E9/L (ref 1.5–4)
LYMPHOCYTES RELATIVE PERCENT: 29.7 % (ref 20–42)
MAGNESIUM: 2 MG/DL (ref 1.6–2.6)
MCH RBC QN AUTO: 28.9 PG (ref 26–35)
MCHC RBC AUTO-ENTMCNC: 31.5 % (ref 32–34.5)
MCV RBC AUTO: 92 FL (ref 80–99.9)
MONOCYTES ABSOLUTE: 0.84 E9/L (ref 0.1–0.95)
MONOCYTES RELATIVE PERCENT: 9.4 % (ref 2–12)
NEUTROPHILS ABSOLUTE: 5.15 E9/L (ref 1.8–7.3)
NEUTROPHILS RELATIVE PERCENT: 58 % (ref 43–80)
PDW BLD-RTO: 13.1 FL (ref 11.5–15)
PHOSPHORUS: 3.8 MG/DL (ref 2.5–4.5)
PLATELET # BLD: 176 E9/L (ref 130–450)
PMV BLD AUTO: 10.6 FL (ref 7–12)
POTASSIUM SERPL-SCNC: 4.7 MMOL/L (ref 3.5–5)
RBC # BLD: 4.25 E12/L (ref 3.8–5.8)
SODIUM BLD-SCNC: 139 MMOL/L (ref 132–146)
WBC # BLD: 8.9 E9/L (ref 4.5–11.5)

## 2020-09-22 PROCEDURE — 83735 ASSAY OF MAGNESIUM: CPT

## 2020-09-22 PROCEDURE — 99231 SBSQ HOSP IP/OBS SF/LOW 25: CPT | Performed by: INTERNAL MEDICINE

## 2020-09-22 PROCEDURE — 80048 BASIC METABOLIC PNL TOTAL CA: CPT

## 2020-09-22 PROCEDURE — 36415 COLL VENOUS BLD VENIPUNCTURE: CPT

## 2020-09-22 PROCEDURE — 93005 ELECTROCARDIOGRAM TRACING: CPT | Performed by: INTERNAL MEDICINE

## 2020-09-22 PROCEDURE — 2580000003 HC RX 258: Performed by: INTERNAL MEDICINE

## 2020-09-22 PROCEDURE — 93010 ELECTROCARDIOGRAM REPORT: CPT | Performed by: INTERNAL MEDICINE

## 2020-09-22 PROCEDURE — 6370000000 HC RX 637 (ALT 250 FOR IP): Performed by: INTERNAL MEDICINE

## 2020-09-22 PROCEDURE — 2140000000 HC CCU INTERMEDIATE R&B

## 2020-09-22 PROCEDURE — 93306 TTE W/DOPPLER COMPLETE: CPT

## 2020-09-22 PROCEDURE — APPSS30 APP SPLIT SHARED TIME 16-30 MINUTES: Performed by: CLINICAL NURSE SPECIALIST

## 2020-09-22 PROCEDURE — 85025 COMPLETE CBC W/AUTO DIFF WBC: CPT

## 2020-09-22 PROCEDURE — 99232 SBSQ HOSP IP/OBS MODERATE 35: CPT | Performed by: INTERNAL MEDICINE

## 2020-09-22 PROCEDURE — 84100 ASSAY OF PHOSPHORUS: CPT

## 2020-09-22 RX ADMIN — METOPROLOL SUCCINATE 50 MG: 50 TABLET, EXTENDED RELEASE ORAL at 08:58

## 2020-09-22 RX ADMIN — QUETIAPINE FUMARATE 50 MG: 25 TABLET ORAL at 20:25

## 2020-09-22 RX ADMIN — SODIUM CHLORIDE, PRESERVATIVE FREE 10 ML: 5 INJECTION INTRAVENOUS at 20:26

## 2020-09-22 RX ADMIN — PALIPERIDONE 6 MG: 6 TABLET, EXTENDED RELEASE ORAL at 08:58

## 2020-09-22 RX ADMIN — METOPROLOL SUCCINATE 50 MG: 50 TABLET, EXTENDED RELEASE ORAL at 20:25

## 2020-09-22 RX ADMIN — SODIUM CHLORIDE, PRESERVATIVE FREE 10 ML: 5 INJECTION INTRAVENOUS at 08:58

## 2020-09-22 RX ADMIN — LISINOPRIL 2.5 MG: 5 TABLET ORAL at 08:58

## 2020-09-22 RX ADMIN — RIVAROXABAN 20 MG: 20 TABLET, FILM COATED ORAL at 18:05

## 2020-09-22 ASSESSMENT — PAIN SCALES - GENERAL
PAINLEVEL_OUTOF10: 0

## 2020-09-22 NOTE — CARE COORDINATION
Reached out to the nurse in charge of the care of Miri Chen 84 because house team 2 is planing pt discharge pending on the opinion of EP physician on board. The nurse states that she is waiting for the result of echo coming back and then will reach out the EP physician regarding the discharge.  If everything is expected, she will get back to house team 2. (please reach out to Dr. Majo Cardenas)

## 2020-09-22 NOTE — PROGRESS NOTES
[facility]  Internal Medicine Residency Program  Progress Note - House Team 2    Patient:  Aicha Sinclair 56 y.o. male MRN: [id number]     Date of Service: 9/22/2020     CC: lightheadedness, unsteadiness, palpitations, near-syncope  Overnight events: none    Subjective     Mr. Kyle Syed is a 56year-old [facility] [employer] male with a past medical history of Afib, HFrEF, schizoaffective disorder, CAD, and HTN who presented to the ED 2 days ago (9/19/20) after an episode of lightheadedness, near-syncope, and palpitation while ambulating in the group home where he stays. He denies any falls during the 30-sec episode. He reported the incident to the supervisors in the group home, and EMS brought him to the ED. At the ED, his heart rate was in the 200s and EKG showed Aflutter. Cardizem drip was begun and he was transferred to the floors.     The patient also has a history of HFrEF. In 2016, his EF was 30%. He was fitted for a life vest. A few weeks later, he was to undergo ICD placement. EF at the time was 55%, so no ICD was placed and life vest was discontinued. In 2019, he experienced a similar syncopal episode. EF at the time was 25% and he was not on goal-directed medical therapy. The patient underwent KADEEM and Direct Current Cardioversion yesterday afternoon, after which he went back into normal sinus rhythm. KADEEM showed no evidence of left atrial or left atrial appendage thrombus, and all chambers appeared normal. His estimated EF was 40%. Patient was interviewed and examined this morning. He was accompanied by his brother. The patient denies chest pain, SOB, and palpitations. Options for treating repeat arrhythmias (including ablation) were discussed, and the patient appears open to these options. Telemetry was also reviewed and showed that he remains in normal sinus rhythm.     Objective     Physical Exam:  · Vitals: /67   Pulse 89   Temp 98.2 °F (36.8 °C) (Temporal)   Resp 16   Ht 6' 2\" (1.88 m)   Wt 207 lb 6 oz (94.1 kg)   SpO2 100%   BMI 26.63 kg/m²     · I & O - 24hr: I/O this shift:  · In: 720 [P.O.:720]  · Out: 800 [Urine:800]   · General Appearance: alert, appears stated age, cooperative and no distress  · HEENT:  Head: Normal, normocephalic, atraumatic.   · Lung: clear to auscultation bilaterally  · Heart: regular rate and rhythm, S1, S2 normal, no murmur, click, rub or gallop  · Extremities:  extremities normal, atraumatic, no cyanosis or edema  · Neurologic: Mental status: Alert, oriented, thought content appropriate  Subject  Pertinent Labs & Imaging Studies   kathy  CBC with Differential:    Lab Results   Component Value Date    WBC 8.9 09/22/2020    RBC 4.25 09/22/2020    HGB 12.3 09/22/2020    HCT 39.1 09/22/2020     09/22/2020    MCV 92.0 09/22/2020    MCH 28.9 09/22/2020    MCHC 31.5 09/22/2020    RDW 13.1 09/22/2020    NRBC 0.0 11/21/2016    LYMPHOPCT 29.7 09/22/2020    MONOPCT 9.4 09/22/2020    BASOPCT 0.4 09/22/2020    MONOSABS 0.84 09/22/2020    LYMPHSABS 2.65 09/22/2020    EOSABS 0.19 09/22/2020    BASOSABS 0.04 09/22/2020     CMP:    Lab Results   Component Value Date     09/22/2020    K 4.7 09/22/2020    K 4.4 12/07/2019     09/22/2020    CO2 28 09/22/2020    BUN 18 09/22/2020    CREATININE 1.2 09/22/2020    GFRAA >60 09/22/2020    LABGLOM >60 09/22/2020    GLUCOSE 98 09/22/2020    PROT 7.3 09/19/2020    LABALBU 4.0 09/19/2020    CALCIUM 9.2 09/22/2020    BILITOT 0.4 09/19/2020    ALKPHOS 53 09/19/2020    AST 22 09/19/2020    ALT 18 09/19/2020     Albumin:    Lab Results   Component Value Date    LABALBU 4.0 09/19/2020     Calcium:    Lab Results   Component Value Date    CALCIUM 9.2 09/22/2020     Magnesium:    Lab Results   Component Value Date    MG 2.0 09/22/2020     Phosphorus:    Lab Results   Component Value Date    PHOS 3.8 09/22/2020     LDH:  No results found for: LDH  TSH:    Lab Results   Component Value Date    TSH 1.460 09/19/2020     VITAMIN B12: No components found for: B12  FOLATE:    Lab Results   Component Value Date    FOLATE 19.7 01/16/2018       Student's Assessment and Plan     Shlomo Rebolledo is a 56 y.o. male with a past medical history of Afib, HFrEF, schizoaffective disorder, CAD, and HTN who presents to the hospital with a chief complaint of palpitations, lightheadedness, and near-syncope most consistent with recurrent cardiac arrhythmia. 1. Near-syncopal episode 2/2 atrial flutter vs HFrEF (resolved)              - Sudden onset and presence of palpitations during episode make atrial flutter a more likely etiology              - EKG upon arrival at the ED showed 2:1 atrial flutter with rapid ventricular rate              - proBNP was 588              - No signs of cardiac decompensation upon exam (no JVD, peripheral edema, hepatomegaly, pulmonary edema seen on CXR or heard on auscultation)--makes HFrEF a less likely etiology              - Patient was previously on Cardizem drip which was discontinued              - Increased Toprol XL dose was not considered due to borderline low blood pressures   - KADEEM-guided cardioversion was completed yesterday afternoon, and patient returned to normal sinus rhythm and, on interview this morning, denies any palpitations or lightheadedness     2.  Hx of atrial flutter/atrial fibrillation (acute symptoms have resolved)              - Telemetry and EKG previously showed atrial flutter with rapid ventricular rate              - Patient is currently on Xarelto 20mg daily and Toprol XL 50mg BID              - EP consult appreciated--recommended EP study, but patient declined as he prefers medical treatment over invasive procedures              - Cardizem has been discontinued              - continue to monitor on telemetry              - consider etiologies of A fib/ A flutter (PIRATES)   - KADEEM-guided cardioversion was completed yesterday afternoon and patient returned to normal sinus rhythm   - telemetry today

## 2020-09-22 NOTE — PROGRESS NOTES
[facility]  Internal Medicine Residency / [address]    Attending Physician Statement  I have discussed the case, including pertinent history and exam findings with the resident and the team.  I have seen and examined the patient and the key elements of the encounter have been performed by me. I agree with the assessment, plan and orders as documented by the resident. Patient seen, brother at bedside. Questions were answered. He feels well, has been ambulating without any palpitations. 1.  A flutter, recurrence s/p DCCV on 9/21/20  2. Heart failure reduced ejection fraction, non-ischemic EF 40% on KADEEM  3.  Schizophrenia  4. Remote history of substance abuse    Discussed the importance of continued medications. Should his arrhythmia recur appreciate EP input regarding the possibility of RFA. Will need continued EP follow-up as an outpatient. Remainder of medical problems as per resident note.       Rita Armstrong,   Internal Medicine Residency Faculty

## 2020-09-22 NOTE — PROGRESS NOTES
ARRHYTHMIA EDUCATION    Met with patient to review \"Very Basic\" information relating to Atrial Flutter (AFL)    Discussed the following topics: AFL,  AFL & Stroke Prevention,  AFL Personal Risk factor Reduction,  AFL Treatment Adherence, & the importance of compliance      Patient's Risk Factors /  Risk Factor Reduction Measures:    Excessive Alcohol or stimulant use --Avoid alcohol & stimulants   Smoking --Abstinence from tobacco products        Medications Reviewed: Xarelto    Handouts given on above topics & questions answered. Patient verbalized understanding as evidenced by \"teach back\". Time spent with patient 15 minutes.

## 2020-09-22 NOTE — PROGRESS NOTES
Inpatient Cardiology Progress Note     Chief complaint: follow up for nonischemic cardiomyopathy, atrial flutter     Suzanne Rolon is a 56 y.o. male who is followed by Dr. Tsering Quick and Dr. Camelia Quiroz as an outpatient. He was seen in consultation by Dr. Jus Lance on September 20. SUBJECTIVE: He denies dyspnea, palpitations, or chest discomfort. OBJECTIVE: No apparent distress, lying flat in bed     ROS:  Consist: Denies fevers, chills or night sweats  Heart: Denies chest pain, palpitations, lightheadedness, dizziness or syncope  Lungs: Denies SOB, cough, wheezing, orthopnea or PND  GI: Denies abdominal pain, vomiting or diarrhea  All others negative     PHYSICAL EXAM:   /67   Pulse 89   Temp 98.2 °F (36.8 °C) (Temporal)   Resp 16   Ht 6' 2\" (1.88 m)   Wt 207 lb 6 oz (94.1 kg)   SpO2 100%   BMI 26.63 kg/m²    B/P Range last 24 hours: Systolic (52LEP), MGK:030 , Min:89 , XTJ:564    Diastolic (39CBX), JOQ:27, Min:61, Max:93    CONST: Well developed, well nourished gentleman who appears older than his stated age. Awake, alert and cooperative. No apparent distress  HEENT:   Head- Normocephalic, atraumatic   Throat- Oral mucosa pink and moist  Neck-  No stridor,  jugular venous distention or carotid bruit  CHEST: Chest symmetrical and non-tender to palpation. Respirations unlabored. RESPIRATORY:  Breath sounds clear throughout   CARDIOVASCULAR:     Heart Ausculation- Regular rate and rhythm, no murmur. No s3, s4 or rub   PV: No lower extremity edema. No varicosities. Pedal pulses palpable  ABDOMEN: Soft, non-tender to light palpation. Bowel sounds present. No palpable masses   MS: Good muscle strength and tone. No atrophy or abnormal movements. : Deferred  SKIN: Warm and dry   NEURO / PSYCH: Oriented to person, place and time. Speech clear and appropriate. Follows all commands.  Pleasant affect       Telemetry: SR with PACs and PVCs      Intake/Output Summary (Last 24 hours) at 9/22/2020 1316  Last data filed at 9/22/2020 1128  Gross per 24 hour   Intake 1040 ml   Output 1400 ml   Net -360 ml   I/O incomplete     Weight:   Wt Readings from Last 3 Encounters:   09/19/20 207 lb 6 oz (94.1 kg)   12/11/19 190 lb 6.4 oz (86.4 kg)   02/07/17 171 lb 12.8 oz (77.9 kg)     Current Inpatient Medications:   lisinopril  2.5 mg Oral Daily    metoprolol succinate  50 mg Oral BID    paliperidone  6 mg Oral QAM    QUEtiapine  50 mg Oral Nightly    rivaroxaban  20 mg Oral Dinner    sodium chloride flush  10 mL Intravenous 2 times per day       IV Infusions (if any):      DIAGNOSTIC/ LABORATORY DATA:  Labs:   CBC:   Recent Labs     09/21/20  0436 09/22/20  0424   WBC 7.6 8.9   HGB 13.1 12.3*   HCT 40.3 39.1    176     BMP:   Recent Labs     09/21/20  0436 09/22/20  0424    139   K 4.0 4.7   CO2 24 28   BUN 17 18   CREATININE 1.2 1.2   LABGLOM >60 >60   CALCIUM 9.0 9.2     Mag:   Recent Labs     09/21/20  0436 09/22/20  0424   MG 1.9 2.0     Phos:   Recent Labs     09/21/20  0436 09/22/20  0424   PHOS 3.7 3.8     TSH:   Recent Labs     09/19/20  1645   TSH 1.460     HgA1c: No results found for: LABA1C  No results found for: EAG    BNP: No results for input(s): BNP in the last 72 hours. PT/INR:   Recent Labs     09/20/20  0605   PROTIME 23.8*   INR 2.1     APTT:No results for input(s): APTT in the last 72 hours. CARDIAC ENZYMES:  Recent Labs     09/19/20  1645   TROPONINI <0.01     FASTING LIPID PANEL:  Lab Results   Component Value Date    CHOL 167 01/16/2018    HDL 38 01/16/2018    LDLCALC 99 01/16/2018    TRIG 149 01/16/2018     LIVER PROFILE:  Recent Labs     09/19/20  1645   AST 22   ALT 18   LABALBU 4.0       CXR 9/19/2020: Impression:  No acute cardiopulmonary pathology     KADEEM 9/21/2020 (Dr. Zoran Cantu):  EF estimated at 40%  No evidence of thrombus within left atrium or appendage  Mild mitral regurgitation     ASSESSMENT:   1.  Nonischemic cardiomyopathy  · Likely tachycardia mediated   · EF 40% by KADEEM 9/21 (had

## 2020-09-22 NOTE — ANESTHESIA POSTPROCEDURE EVALUATION
Department of Anesthesiology  Postprocedure Note    Patient: Juan Rob  MRN: [id number]  YOB: 1960  Date of evaluation: 9/22/2020  Time:  7:34 AM     Procedure Summary     Date:  09/21/20 Room / Location:  [facility] CATH LAB; [facility] ECHO    Anesthesia Start:  1600 Anesthesia Stop:  1626    Procedure:  SEY KADEEM CARDIOVERSION W/ ANES Diagnosis:      Scheduled Providers:   Responsible Provider:  Chandan Bhatia MD    Anesthesia Type:  MAC ASA Status:  3          Anesthesia Type: MAC    Ronnie Phase I:      Ronnie Phase II:      Last vitals: Reviewed and per EMR flowsheets.        Anesthesia Post Evaluation    Patient location during evaluation: PACU  Patient participation: complete - patient participated  Level of consciousness: awake and alert  Pain score: 0  Airway patency: patent  Nausea & Vomiting: no vomiting and no nausea  Complications: no  Cardiovascular status: hemodynamically stable  Respiratory status: acceptable  Hydration status: stable

## 2020-09-22 NOTE — PROGRESS NOTES
Cardiac Electrophysiology Inpatient Progress Note    Eladio Antony  1960  Date of Service: 9/22/2020  PCP: No primary care provider on file. Cardiologist: Los Hendrix MD  Attending Electrophysiologist: Jenna Mullins DO  Primary Electrophysiologist: Leobardo Daigle MD        Subjective: Eladio Antony is seen in hospital follow-up and management of: Persistent Atrial Flutter. Eladio Antony is a 57 yo male who I was asked to see in Cardiac Electrophysiology consultation for management of atrial flutter. He was diagnosed with new onset atrial flutter in November 2016 in the setting setting of alcohol abuse, decompensated heart failure, LVEF 30% at that time. He was cardioverted on 11/16/16 and placed on metoprolol 50 mg twice daily.     1/25/2017: Seen by Dr. Conor Finnegan with electrophysiology with shortness of breath palpitations. He had also had a LifeVest shock secondary to his arrhythmia, patient was noncompliant with medications. Repeat LVEF was 55% so LifeVest was discontinued. Noncompliance with medications was again noted     12/2019: Hospitalized for atrial flutter. KADEEM showed severe biventricular  Systolic dysfunction. He had cardioversion and was again discharged on 50 mg Toprol-XL twice daily.     9/19/20 : Seen in the emergency room for dizziness, lightheadedness and unsteadiness. He was found to be in rapid atrial flutter. He received lidocaine in the emergency room; diltiazem drip was started subsequently    09/21/2020: Eladio Antony is seen in hospital follow-up he remains in atrial flutter now with a controled rate off IV diltiazem and is agreeable to KADEEM/CV today he has no cardiac complaints. 9/22/2020: Eladio Antony is seen in hospital follow-up, he underwent KADEEM/cardioversion yesterday and maintained sinus rhythm since that time, he has no cardiac complaints.       Patient Active Problem List   Diagnosis    [facility]oaffective disorder, chronic condition ([facility] [address]    Chronic systolic heart failure (HCC)    [facility]    Cardiomyopathy ([facility] [address]    Typical atrial flutter ([facility])         Scheduled Medications:   lisinopril  2.5 mg Oral Daily    metoprolol succinate  50 mg Oral BID    paliperidone  6 mg Oral QAM    QUEtiapine  50 mg Oral Nightly    rivaroxaban  20 mg Oral Dinner    sodium chloride flush  10 mL Intravenous 2 times per day       Infusion Medications:      PRN Medications:  sodium chloride flush, acetaminophen **OR** acetaminophen, polyethylene glycol, promethazine **OR** ondansetron, perflutren lipid microspheres    No Known Allergies    Wt Readings from Last 3 Encounters:   09/19/20 207 lb 6 oz (94.1 kg)   12/11/19 190 lb 6.4 oz (86.4 kg)   02/07/17 171 lb 12.8 oz (77.9 kg)     Temp Readings from Last 3 Encounters:   09/22/20 97.7 °F (36.5 °C) (Oral)   12/11/19 98.5 °F (36.9 °C) (Temporal)   01/25/17 98.4 °F (36.9 °C) (Oral)     BP Readings from Last 3 Encounters:   09/22/20 113/61   09/21/20 89/72   12/11/19 108/62     Pulse Readings from Last 3 Encounters:   09/22/20 78   12/11/19 65   02/07/17 88         Intake/Output Summary (Last 24 hours) at 9/22/2020 1559  Last data filed at 9/22/2020 1350  Gross per 24 hour   Intake 1280 ml   Output 800 ml   Net 480 ml       ROS:   Constitutional: Negative for fever, activity change and appetite change. HENT: Negative for epistaxis, difficulty swallowing. Eyes: Negative for blurred vision or double vision. Respiratory:  Negative for chest tightness, and wheezing  pos for cough, , shortness of breath. Cardiovascular: Negative for chest pain, palpitations and leg swelling. Gastrointestinal: Negative for abdominal pain, heartburn, or blood in stool. Genitourinary: Negative for hematuria, burning or frequency. Musculoskeletal: Negative for myalgias, stiffness, or swelling. Skin: Negative for rash, pain, or lumps. Neurological: Negative for syncope, seizures, or headaches.    Psychiatric/Behavioral: Negative for confusion and agitation. The patient is not nervous/anxious. PHYSICAL EXAM:  Vitals:    20 2019 20 0000 20 0800 20 1445   BP: 114/67 109/61 105/67 113/61   Pulse: 72 69 89 78   Resp:  16 16 14   Temp:  98.1 °F (36.7 °C) 98.2 °F (36.8 °C) 97.7 °F (36.5 °C)   TempSrc:  Oral Temporal Oral   SpO2:  98% 100% 97%   Weight:       Height:        Constitutional: In NAD seen laying nearly flat in bed   Head: Normocephalic and atraumatic. Neck: No hepatojugular reflux and no JVD present  Cardiovascular: S1 , S2 present RRR  Pulmonary/Chest:  No respiratory distress. Abdominal: Soft. Normal appearance   Musculoskeletal: Normal range of motion of all extremities  Neurological: Alert    Skin: Skin is warm and dry. Extremity:   No edema. Psychiatric: Normal mood and affect. Thought content normal.       Pertinent Labs:  CBC:   Recent Labs     20  0605 20  0436 20  0424   WBC 7.1 7.6 8.9   HGB 13.1 13.1 12.3*   HCT 40.8 40.3 39.1    175 176     BMP:  Recent Labs     20  0605 20  0436 20  0424    141 139   K 4.2 4.0 4.7    103 101   CO2 23 24 28   BUN 15 17 18   CREATININE 1.1 1.2 1.2   CALCIUM 9.2 9.0 9.2     ABGs: No results found for: PHART, PO2ART, TPA4TLC  INR:   Recent Labs     20  0605   INR 2.1     BNP: No results for input(s): BNP in the last 72 hours.    TSH:   Lab Results   Component Value Date    TSH 1.460 2020      Cardiac Injury Profile:   Recent Labs     20  1645   TROPONINI <0.01      Lipid Profile:   Lab Results   Component Value Date    TRIG 149 2018    HDL 38 2018    LDLCALC 99 2018    CHOL 167 2018      Hemoglobin A1C: No components found for: HGBA1C   Xray: Xr Chest Portable    Result Date: 2020  Patient MRN: [id number] : 1960 Age:  56 years Gender: Male Order Date: 2020 5:00 PM Exam: XR CHEST PORTABLE Number of Images: 1 view Indication:  Chest pain Comparison: Anterior upright chest 2019 and 2 view upright chest 2017 Findings: The lungs are symmetrically expanded, and are clear. There is no evidence of pneumothorax or pleural effusion. Cardiovascular shadows are normal in appearance. There is no evidence of cardiac enlargement or decompensation. Skeletal structures show no evidence of acute pathology. Overlying EKG leads are present. No acute cardiopulmonary pathology.         Pertinent Cardiac Testin/2019 KADEEM     Left Ventricle   Mildly dilated left ventricle.   Severely reduced left ventricular systolic function.   Ejection fraction is visually estimated at 20-25%.      Right Ventricle   Normal size right ventricle cavity.   Right ventricle global systolic function is moderately reduced .      Left Atrium   Moderately dilated left atrium.   There was evidence of spontaneous echocardiographic contrast in the left   atrium.   There is no left atrial appendage thrombus.   Agitated saline injected for shunt evaluation.   No evidence of patent foramen ovale.      Right Atrium   Moderately enlarged right atrium size.   Spontaneous echocardiographic contrast present      Mitral Valve   Structurally normal mitral valve.   Mild to moderate mitral regurgitation is present.      Tricuspid Valve   The tricuspid valve appears structurally normal.   Mild tricuspid regurgitation.      Aortic Valve   Structurally normal aortic valve.      Pulmonic Valve   The pulmonic valve was not well visualized.      Pericardial Effusion   No pericardial effusion.      Aorta   Aortic root dimension within normal limits.   No significant plaque noted in the descending aorta.      Conclusions      Summary   Mildly dilated left ventricle.   Severely reduced left ventricular systolic function.   Moderately dilated left atrium.   There was evidence of spontaneous echocardiographic contrast in the left   atrium.   Right ventricle global systolic function is the findings and plans with the nurse practitioner and agree as documented in his note     Electronically signed by Elvis Cardenas DO on 9/22/20 at 9:36 PM EDT

## 2020-09-23 VITALS
BODY MASS INDEX: 26.62 KG/M2 | RESPIRATION RATE: 18 BRPM | WEIGHT: 207.38 LBS | OXYGEN SATURATION: 98 % | SYSTOLIC BLOOD PRESSURE: 110 MMHG | DIASTOLIC BLOOD PRESSURE: 65 MMHG | HEIGHT: 74 IN | TEMPERATURE: 98.2 F | HEART RATE: 66 BPM

## 2020-09-23 LAB
ANION GAP SERPL CALCULATED.3IONS-SCNC: 11 MMOL/L (ref 7–16)
BASOPHILS ABSOLUTE: 0.03 E9/L (ref 0–0.2)
BASOPHILS RELATIVE PERCENT: 0.4 % (ref 0–2)
BUN BLDV-MCNC: 16 MG/DL (ref 8–23)
CALCIUM SERPL-MCNC: 9.5 MG/DL (ref 8.6–10.2)
CHLORIDE BLD-SCNC: 100 MMOL/L (ref 98–107)
CO2: 28 MMOL/L (ref 22–29)
CREAT SERPL-MCNC: 1 MG/DL (ref 0.7–1.2)
EOSINOPHILS ABSOLUTE: 0.19 E9/L (ref 0.05–0.5)
EOSINOPHILS RELATIVE PERCENT: 2.6 % (ref 0–6)
GFR AFRICAN AMERICAN: >60
GFR NON-AFRICAN AMERICAN: >60 ML/MIN/1.73
GLUCOSE BLD-MCNC: 96 MG/DL (ref 74–99)
HCT VFR BLD CALC: 41.2 % (ref 37–54)
HEMOGLOBIN: 13.2 G/DL (ref 12.5–16.5)
IMMATURE GRANULOCYTES #: 0.04 E9/L
IMMATURE GRANULOCYTES %: 0.5 % (ref 0–5)
LYMPHOCYTES ABSOLUTE: 1.93 E9/L (ref 1.5–4)
LYMPHOCYTES RELATIVE PERCENT: 26.4 % (ref 20–42)
MAGNESIUM: 1.9 MG/DL (ref 1.6–2.6)
MCH RBC QN AUTO: 29.3 PG (ref 26–35)
MCHC RBC AUTO-ENTMCNC: 32 % (ref 32–34.5)
MCV RBC AUTO: 91.4 FL (ref 80–99.9)
MONOCYTES ABSOLUTE: 0.66 E9/L (ref 0.1–0.95)
MONOCYTES RELATIVE PERCENT: 9 % (ref 2–12)
NEUTROPHILS ABSOLUTE: 4.45 E9/L (ref 1.8–7.3)
NEUTROPHILS RELATIVE PERCENT: 61.1 % (ref 43–80)
PDW BLD-RTO: 13.1 FL (ref 11.5–15)
PHOSPHORUS: 3.8 MG/DL (ref 2.5–4.5)
PLATELET # BLD: 181 E9/L (ref 130–450)
PMV BLD AUTO: 10.4 FL (ref 7–12)
POTASSIUM SERPL-SCNC: 4.1 MMOL/L (ref 3.5–5)
RBC # BLD: 4.51 E12/L (ref 3.8–5.8)
SODIUM BLD-SCNC: 139 MMOL/L (ref 132–146)
WBC # BLD: 7.3 E9/L (ref 4.5–11.5)

## 2020-09-23 PROCEDURE — 80048 BASIC METABOLIC PNL TOTAL CA: CPT

## 2020-09-23 PROCEDURE — 85025 COMPLETE CBC W/AUTO DIFF WBC: CPT

## 2020-09-23 PROCEDURE — 6370000000 HC RX 637 (ALT 250 FOR IP): Performed by: INTERNAL MEDICINE

## 2020-09-23 PROCEDURE — 99231 SBSQ HOSP IP/OBS SF/LOW 25: CPT | Performed by: STUDENT IN AN ORGANIZED HEALTH CARE EDUCATION/TRAINING PROGRAM

## 2020-09-23 PROCEDURE — 2580000003 HC RX 258: Performed by: INTERNAL MEDICINE

## 2020-09-23 PROCEDURE — 83735 ASSAY OF MAGNESIUM: CPT

## 2020-09-23 PROCEDURE — 36415 COLL VENOUS BLD VENIPUNCTURE: CPT

## 2020-09-23 PROCEDURE — 84100 ASSAY OF PHOSPHORUS: CPT

## 2020-09-23 RX ADMIN — METOPROLOL SUCCINATE 50 MG: 50 TABLET, EXTENDED RELEASE ORAL at 10:19

## 2020-09-23 RX ADMIN — PALIPERIDONE 6 MG: 6 TABLET, EXTENDED RELEASE ORAL at 10:20

## 2020-09-23 RX ADMIN — LISINOPRIL 2.5 MG: 5 TABLET ORAL at 10:20

## 2020-09-23 RX ADMIN — SODIUM CHLORIDE, PRESERVATIVE FREE 10 ML: 5 INJECTION INTRAVENOUS at 10:20

## 2020-09-23 ASSESSMENT — PAIN SCALES - GENERAL
PAINLEVEL_OUTOF10: 0

## 2020-09-23 NOTE — PROGRESS NOTES
[employer] called for patient, upon taxi arrival take patient down to main lobby and out to taxi, noticed patient IV site had continued to bleed and bleed through sweatshirt onto pants and wheelchair. Returned to inside building and asked  at door to call up to unit to bring down dry dressings to clean patient up. Patient then proceeds to say I don't feel right I want to speak to those doctors in the group. So upon returning to unit, perfect served IM Team 2. The doctors came and spoke with patient.

## 2020-09-23 NOTE — DISCHARGE SUMMARY
normal  · ENT: tympanic membrane, external ear and ear canal normal bilaterally, nose without deformity, nasal mucosa and turbinates normal without polyps  · Neck: supple and non-tender without mass, no thyromegaly or thyroid nodules, no cervical lymphadenopathy  · Pulmonary/Chest: clear to auscultation bilaterally- no wheezes, rales or rhonchi, normal air movement, no respiratory distress  · Cardiovascular: tachycardia, irregular rate and irregular rhythm, normal S1 and S2, no murmurs, rubs, clicks, or gallops, distal pulses intact, no carotid bruits  · Abdomen: soft, non-tender, non-distended, normal bowel sounds, no masses or organomegaly  · Extremities: no cyanosis, clubbing or edema  · Musculoskeletal: normal range of motion, no joint swelling, deformity or tenderness  · Neurologic: reflexes normal and symmetric, no cranial nerve deficit, gait, coordination and speech normal.  Patient's right arm with slight tremors ( chronic).  Equal strength throughout.   (PE results on admission)    EKG on admission: Atrial flutter with variable AV block  EKG on discharge: Normal sinus rhythm    KADEEM, before direct cardioversion, during hospitalization   Summary   Atrial flutter RVR throughout study. LV systolic function is mildly reduced. Ejection fraction is visually estimated at 40%. No evidence of thrombus within left atrium or appendage. Mild mitral regurgitation. Signature    Echo on discharge:   Conclusions      Summary   Normal left ventricular size. LV systolic function is low normal.   Ejection fraction is visually estimated at 50-55%. Indeterminate diastolic function. No regional wall motion abnormalities seen. Normal left ventricular wall thickness. Normal right ventricular size and function. The left atrium is mildly dilated. No significant valvular abnormalities. Pending test results: None    Consults:  1. Cardiology,  2. EP    Procedures:  1. Cardioversion  2.  KADEEM    Condition at discharge: Stable    Disposition: home    Discharge Medications:  Current Discharge Medication List      CONTINUE these medications which have NOT CHANGED    Details   ibuprofen (ADVIL;MOTRIN) 800 MG tablet Take 800 mg by mouth every 8 hours as needed for Pain      Multiple Vitamins-Minerals (CERTAVITE SENIOR/ANTIOXIDANT) TABS Take 1 tablet by mouth every morning      rivaroxaban (XARELTO) 20 MG TABS tablet Take 1 tablet by mouth Daily with supper  Qty: 30 tablet, Refills: 5      lisinopril (PRINIVIL;ZESTRIL) 2.5 MG tablet Take 1 tablet by mouth daily  Qty: 30 tablet, Refills: 3      metoprolol succinate (TOPROL XL) 50 MG extended release tablet Take 1 tablet by mouth 2 times daily  Qty: 30 tablet, Refills: 3      QUEtiapine (SEROQUEL) 50 MG tablet Take 50 mg by mouth nightly      paliperidone (INVEGA) 6 MG extended release tablet Take 6 mg by mouth every morning         STOP taking these medications       Cholecalciferol (VITAMIN D3) 25 MCG (1000 UT) TABS Comments:   Reason for Stopping:               Activity: activity as tolerated  Diet: cardiac diet    ATRlAL FLUTTER DISCHARGE INSTRUCTIONS     · Take your medicines exactly as directed. Don't skip doses. · Risk Factor Modification:   1. Hypertension: Take BP medications as prescribed,  Low sodium diet (2 grams/daily)  2. Coronary Artery Disease: Follow heart healthy diet, regular exercise, Medication compliance, Follow up with your cardiologist regularly   3. Heart Failure: Medication compliance, daily weights, Low sodium diet (2 grams/daily)  4. Obesity:  Moderate exercise program, calorie restricted diet, BMI goal of < 27, Moderate cardiopulmonary activity 250 minutes per week  unless not advised by physician   5. Diabetes: Follow prescribed diabetic diet, stringent blood  sugar control, medication compliance   6. Thyroid Disease:  Medication compliance. Get thyroid levels periodically checked.       7. Sleep Apnea:  Sleep study,  CPAP as prescribed  8. Chronic Lung Disease:  NO Smoking, take inhalers as prescribed, follow up with your pulmonologist regularly  9. Avoid Substances That Cause Heart Rhythm Disturbances:  No smoking, Avoid or limit alcohol, Avoid stimulants (caffeine, diet pills, cocaine)      · Take your blood thinner as directed. Report any bleeding issues to your doctor. · Learn to take your own pulse. Keep a record of your results. Ask your doctor which pulse rates mean that you need medical attention. Slowing your pulse is often the goal of treatment. Ask your doctor if it's OK for you to use an automatic machine to check your pulse at home. Sometimes these machines don't count the pulse correctly when you have atrial fibrillation. · Limit your intake of coffee, tea, cola, and other beverages with caffeine. Tomma Fannie your doctor about whether you should eliminate caffeine. · Avoid over-the-counter medicines that have caffeine in them. · Let your doctor know what medicines you take, including prescription and over-the-counter medicines, as well as any supplements. They interfere with some medicines given for atrial fibrillation. · Ask your doctor about whether you can drink alcohol. Some people need to avoid alcohol to better treat atrial fibrillation. If you are taking blood-thinner medicines, alcohol may interfere with them by increasing their effect. · Never take stimulants such as amphetamines or cocaine. These drugs can speed up your heart rate and trigger atrial fibrillation.     Follow-up care  · Follow up with your doctor as advised.   · Do NOT miss your doctor appointments     When should I call my healthcare provider  Call your healthcare provider right away if you have any of the following:  · Weakness  · Dizziness  · Fainting  · Fatigue  · Shortness of breath  · Chest pain with increased activity  · A change in the usual regularity of your heartbeat, or an unusually fast heartbeat  · Any bleeding issues       Internal medicine     Follow ups  · Please follow up with your primary care physician (  primary care provider on file.) within 10 days of discharge from hospital. Please call as soon as possible to make an appointment. Please contact the internal medicine clinic for an appointment if you are unable to get an appointment with your PCP. · Please keep all other follow up appointments:  ? Cardiology  ? Electrophysiology      Changes in healthcare   · Please take all medications as indicated  · Diet: cardiac diet   · Activity: activity as tolerated  · New Medications started during this hospital stay  ? None  · Changes to your medications  ? None  · Medications you should stop taking   ? None   ·  Please contact us if you have any concerns, wish to change or make an appointment:  · Internal medicine clinic   · Phone: [phone]  · Fax: [phone]  · [address] [facility] [address]  · Or please call the nurse line at [phone]. · Should you have further questions in regards to this visit, you can review your clinical note and after visit summary document on your [facility] account. Other questions can be directed to our nurse line at [phone].      · Other than any new prescriptions given to you today, the list of home medications on this After Visit Summary are based on information provided to us from you and your healthcare providers. This information, including the list, dose, and frequency of medications is only as accurate as the information you provided. If you have any questions or concerns about your home medications, please contact your Primary Care Physician for further clarification.       Renata Marie MD  PGY 1   8:49 AM 9/23/2020

## 2020-09-23 NOTE — PROGRESS NOTES
[facility]  Internal Medicine Residency Program  Progress Note - House Team 1    Patient:  Yuliya Matthews 56 y.o. male MRN: [id number]     Date of Service: 9/22/2020     CC: palpitations, lightheadedness and unsteadiness  Overnight events: None  Days since admission: 4    Subjective     Patient was seen at bed side in the am.   awake alert and respond to questions appropriately. no new complaints or events overnight. Denies chest pain or sob. Remains in sinus rhythm after direct cardioversion, telemetry shows occasional PVC overnight    Objective     Physical Exam:  · Vitals: /81   Pulse 87   Temp 98.2 °F (36.8 °C) (Oral)   Resp 16   Ht 6' 2\" (1.88 m)   Wt 207 lb 6 oz (94.1 kg)   SpO2 97%   BMI 26.63 kg/m²     · I & O - 24hr: No intake/output data recorded. · General Appearance: alert and oriented to person, place and time, well developed and well- nourished, in no acute distress. Cooperative -American male. Does not appear to be in any acute distress.   · Skin: warm and dry, no rash or erythema  · Head: normocephalic and atraumatic  · Eyes: pupils equal, round, and reactive to light, extraocular eye movements intact, conjunctivae normal  · ENT: tympanic membrane, external ear and ear canal normal bilaterally, nose without deformity, nasal mucosa and turbinates normal without polyps  · Neck: supple and non-tender without mass, no thyromegaly or thyroid nodules, no cervical lymphadenopathy  · Pulmonary/Chest: clear to auscultation bilaterally- no wheezes, rales or rhonchi, normal air movement, no respiratory distress  · Cardiovascular: regular rate and rhythm, normal S1 and S2, no murmurs, rubs, clicks, or gallops, distal pulses intact, no carotid bruits  · Abdomen: soft, non-tender, non-distended, normal bowel sounds, no masses or organomegaly  · Extremities: no cyanosis, clubbing or edema  · Musculoskeletal: normal range of motion, no joint swelling, deformity or tenderness  · Neurologic: reflexes normal and symmetric, no cranial nerve deficit, gait, coordination and speech normal.  Patient's right arm with slight tremors ( chronic). Equal strength throughout. Subject  Pertinent Labs & Imaging Studies   kathy  CBC with Differential:    Lab Results   Component Value Date    WBC 8.9 09/22/2020    RBC 4.25 09/22/2020    HGB 12.3 09/22/2020    HCT 39.1 09/22/2020     09/22/2020    MCV 92.0 09/22/2020    MCH 28.9 09/22/2020    MCHC 31.5 09/22/2020    RDW 13.1 09/22/2020    NRBC 0.0 11/21/2016    LYMPHOPCT 29.7 09/22/2020    MONOPCT 9.4 09/22/2020    BASOPCT 0.4 09/22/2020    MONOSABS 0.84 09/22/2020    LYMPHSABS 2.65 09/22/2020    EOSABS 0.19 09/22/2020    BASOSABS 0.04 09/22/2020     CMP:    Lab Results   Component Value Date     09/22/2020    K 4.7 09/22/2020    K 4.4 12/07/2019     09/22/2020    CO2 28 09/22/2020    BUN 18 09/22/2020    CREATININE 1.2 09/22/2020    GFRAA >60 09/22/2020    LABGLOM >60 09/22/2020    GLUCOSE 98 09/22/2020    PROT 7.3 09/19/2020    LABALBU 4.0 09/19/2020    CALCIUM 9.2 09/22/2020    BILITOT 0.4 09/19/2020    ALKPHOS 53 09/19/2020    AST 22 09/19/2020    ALT 18 09/19/2020     EKG on admission: A flutter 2:1   EKG after cardioverson: sinus rhythm  EF 40% on KADEEM, 50-55% on TTE      Resident's Assessment and Plan     Michaell Kawasaki is a 56 y.o. male past medical history significant for atrial fibrillation s/p [facility] and an episode of cardiac defibrillation d/t VT (on metoprolol and Xarelto 20 mg), HFrEF (EF 25% in 2019), CAD, schizophrenia on quetiapine and paliperidone. 1.  Symptomatic palpitations likely secondary to atrial flutter 2:1  EKG in ED appears to be a flutter 2:1. Continue to monitor on telemetry. Electrolytes within normal limits. CBC within normal limits INR 1.3. PTT 14. 6. proBNP 588.   T4 and TSH normal  Still a flutter with variable AV block  D/c diltiazem drip. restart metoprolol   Successfully converted to sinus rhythm after direct cardioversion, EF 40% on KADEEM, 50-55% on TTE, Life vest is likely not required   Continue metoprolol and Xarelto  EKG in one week as outpatient, ready for discharge     2. Hx of Atrial flutter/atrial fibrillation  Patient is on Xarelto 20 mg daily. Pt [id number] 1. Continue Xarelto. D/c diltiazem drip. restart metoprolol     3. HFrEF likely 2/2 nonischemic cardiomyopathy? November 2016 patient was noted to have LVD with an EF of 30% and moderate to severe MR on KADEEM and he underwent [facility] and was fitted for a LifeVest.    Few weeks later he was properly shock for VT and went to the hospital for admission for ICD, however at that time it was noted his EF was improved to 55% and ICD was not placed. Patient had symptoms of syncope in 2019 his EF was found to be 25%. He is currently not on goal directed medical therapy. Recommend starting goal directed medical therapy when patient stable. EF 40% on KADEEM, 50-55% on TTE    4. HTN  Restart patient home lisinopril. Blood pressure is still currently borderline normal.      5.  Pre-syncopal episode likely secondary to atrial flutter 2:1  Experienced a presyncopal episode while at the group home which required him to hold onto the wall. He denies any trauma to his head or any fall. Denies any confusion. Alert and oriented x3.     6.  Schizophrenia  Continue quetiapine and paliperidone. Patient QTC corrected 417. We will continue medications and continue to monitor QTC corrected.     7.  Generalized weakness and right hand tremor  This appears to be a chronic issue as per the patient.   PT evaluation requested          PT/OT evaluation: PT evaluate and treat   DVT prophylaxis/ GI prophylaxis: Xarelto/not indicated  Disposition: home      Erik Linares MD, PGY-1  Attending physician: Dr. Oumar Delatorre

## 2020-09-23 NOTE — PROGRESS NOTES
Notified Thiago Welsh ([phone]) at [facility] that patient was not going to be discharged this evening. That patient will be returning to [facility] tomorrow. Melina Wilson, this RN will pass along for Day RN to notify her of possible discharge time.

## 2020-09-23 NOTE — CARE COORDINATION
SOCIAL WORK/CASEMANAGEMENT TRANSITION OF CARE [id number] Hextkate Mcclendonchiquis, [address], [place], [phone]): back to group home today via taxi. Pt held last night due to bleeding iv line that was removed.  Delilah Huizar  9/23/2020

## 2020-09-23 NOTE — PROGRESS NOTES
Cardiac Electrophysiology Inpatient Progress Note    Juan Rob  1960  Date of Service: 9/23/2020  PCP: No primary care provider on file. Cardiologist: Lashonda Manning MD  Attending Electrophysiologist: Elaine Gan DO  Primary Electrophysiologist: Cora Wilburn MD        Subjective: Juan Rob is seen in hospital follow-up and management of: Persistent Atrial Flutter. Juan Rob is a 55 yo male who I was asked to see in Cardiac Electrophysiology consultation for management of atrial flutter. He was diagnosed with new onset atrial flutter in November 2016 in the setting setting of alcohol abuse, decompensated heart failure, LVEF 30% at that time. He was cardioverted on 11/16/16 and placed on metoprolol 50 mg twice daily.     1/25/2017: Seen by Dr. Rachel Carlson with electrophysiology with shortness of breath palpitations. He had also had a LifeVest shock secondary to his arrhythmia, patient was noncompliant with medications. Repeat LVEF was 55% so LifeVest was discontinued. Noncompliance with medications was again noted     12/2019: Hospitalized for atrial flutter. KADEEM showed severe biventricular  Systolic dysfunction. He had cardioversion and was again discharged on 50 mg Toprol-XL twice daily.     9/19/20 : Seen in the emergency room for dizziness, lightheadedness and unsteadiness. He was found to be in rapid atrial flutter. He received lidocaine in the emergency room; diltiazem drip was started subsequently    09/21/2020: Juan Rob is seen in hospital follow-up he remains in atrial flutter now with a controled rate off IV diltiazem and is agreeable to KADEEM/CV today he has no cardiac complaints. 9/22/2020: Juan Rob is seen in hospital follow-up, he underwent KADEEM/cardioversion yesterday and maintained sinus rhythm since that time, he has no cardiac complaints. 9/23/20: Patient remains in sinus rhythm. He denies any complaints at this time.       Patient Active Problem List   Diagnosis  Schizoaffective disorder, chronic condition (HCC)    Chronic systolic heart failure (HCC)    SVT (supraventricular tachycardia) (HCC)    Cardiomyopathy (HCC)    Typical atrial flutter (HCC)         Scheduled Medications:   lisinopril  2.5 mg Oral Daily    metoprolol succinate  50 mg Oral BID    paliperidone  6 mg Oral QAM    QUEtiapine  50 mg Oral Nightly    rivaroxaban  20 mg Oral Dinner    sodium chloride flush  10 mL Intravenous 2 times per day       Infusion Medications:      PRN Medications:  sodium chloride flush, acetaminophen **OR** acetaminophen, polyethylene glycol, promethazine **OR** ondansetron, perflutren lipid microspheres    No Known Allergies    Wt Readings from Last 3 Encounters:   09/19/20 207 lb 6 oz (94.1 kg)   12/11/19 190 lb 6.4 oz (86.4 kg)   02/07/17 171 lb 12.8 oz (77.9 kg)     Temp Readings from Last 3 Encounters:   09/23/20 97.3 °F (36.3 °C) (Temporal)   12/11/19 98.5 °F (36.9 °C) (Temporal)   01/25/17 98.4 °F (36.9 °C) (Oral)     BP Readings from Last 3 Encounters:   09/23/20 (!) 104/58   09/21/20 89/72   12/11/19 108/62     Pulse Readings from Last 3 Encounters:   09/23/20 59   12/11/19 65   02/07/17 88         Intake/Output Summary (Last 24 hours) at 9/23/2020 0952  Last data filed at 9/23/2020 0645  Gross per 24 hour   Intake 840 ml   Output 800 ml   Net 40 ml       ROS:   Constitutional: Negative for fever, activity change and appetite change. HENT: Negative for epistaxis, difficulty swallowing. Eyes: Negative for blurred vision or double vision. Respiratory:  Negative for chest tightness, and wheezing  pos for cough, , shortness of breath. Cardiovascular: Negative for chest pain, palpitations and leg swelling. Gastrointestinal: Negative for abdominal pain, heartburn, or blood in stool. Genitourinary: Negative for hematuria, burning or frequency. Musculoskeletal: Negative for myalgias, stiffness, or swelling. Skin: Negative for rash, pain, or lumps. Neurological: Negative for syncope, seizures, or headaches. Psychiatric/Behavioral: Negative for confusion and agitation. The patient is not nervous/anxious. PHYSICAL EXAM:  Vitals:    20 1445 20 1925 20 0000 20 0400   BP: 113/61 138/81 (!) 105/54 (!) 104/58   Pulse: 78 87 65 59   Resp: 14 16 16 16   Temp: 97.7 °F (36.5 °C) 98.2 °F (36.8 °C) 97.8 °F (36.6 °C) 97.3 °F (36.3 °C)   TempSrc: Oral Oral Oral Temporal   SpO2: 97% 97% 98% 97%   Weight:       Height:       Constitutional: In NAD seen laying nearly flat in bed   Head: Normocephalic and atraumatic. Neck: No hepatojugular reflux and no JVD present  Pulmonary/Chest:  No respiratory distress. Abdominal: Soft. Normal appearance   Musculoskeletal: Normal range of motion of all extremities  Neurological: Alert     Extremity:   No edema. Psychiatric: Normal mood and affect. Thought content normal.     Pertinent Labs:  CBC:   Recent Labs     20  0436 20  0424 20  0651   WBC 7.6 8.9 7.3   HGB 13.1 12.3* 13.2   HCT 40.3 39.1 41.2    176 181     BMP:  Recent Labs     20  0436 20  0424 20  0651    139 139   K 4.0 4.7 4.1    101 100   CO2 24 28 28   BUN 17 18 16   CREATININE 1.2 1.2 1.0   CALCIUM 9.0 9.2 9.5     ABGs: No results found for: PHART, PO2ART, LDV9PVS  INR:   No results for input(s): INR in the last 72 hours. BNP: No results for input(s): BNP in the last 72 hours. TSH:   Lab Results   Component Value Date    TSH 1.460 2020      Cardiac Injury Profile:   No results for input(s): CKTOTAL, CKMB, CKMBINDEX, TROPONINI in the last 72 hours.    Lipid Profile:   Lab Results   Component Value Date    TRIG 149 2018    HDL 38 2018    LDLCALC 99 2018    CHOL 167 2018      Hemoglobin A1C: No components found for: HGBA1C   Xray: Xr Chest Portable    Result Date: 2020  Patient MRN: [id number] : 1960 Age:  56 years Gender: Male Order Date: 2020 5:00 PM Exam: XR CHEST PORTABLE Number of Images: 1 view Indication:  Chest pain Comparison: Anterior upright chest 2019 and 2 view upright chest 2017 Findings: The lungs are symmetrically expanded, and are clear. There is no evidence of pneumothorax or pleural effusion. Cardiovascular shadows are normal in appearance. There is no evidence of cardiac enlargement or decompensation. Skeletal structures show no evidence of acute pathology. Overlying EKG leads are present. No acute cardiopulmonary pathology.         Pertinent Cardiac Testin/2019 KADEEM     Left Ventricle   Mildly dilated left ventricle.   Severely reduced left ventricular systolic function.   Ejection fraction is visually estimated at 20-25%.      Right Ventricle   Normal size right ventricle cavity.   Right ventricle global systolic function is moderately reduced .      Left Atrium   Moderately dilated left atrium.   There was evidence of spontaneous echocardiographic contrast in the left   atrium.   There is no left atrial appendage thrombus.   Agitated saline injected for shunt evaluation.   No evidence of patent foramen ovale.      Right Atrium   Moderately enlarged right atrium size.   Spontaneous echocardiographic contrast present      Mitral Valve   Structurally normal mitral valve.   Mild to moderate mitral regurgitation is present.      Tricuspid Valve   The tricuspid valve appears structurally normal.   Mild tricuspid regurgitation.      Aortic Valve   Structurally normal aortic valve.      Pulmonic Valve   The pulmonic valve was not well visualized.      Pericardial Effusion   No pericardial effusion.      Aorta   Aortic root dimension within normal limits.   No significant plaque noted in the descending aorta.      Conclusions      Summary   Mildly dilated left ventricle.   Severely reduced left ventricular systolic function.   Moderately dilated left atrium.   There was evidence of spontaneous echocardiographic contrast in the left   atrium.   Right ventricle global systolic function is moderately reduced .   Moderately enlarged right atrium size.   Spontaneous echocardiographic contrast present   Mild to moderate mitral regurgitation is present.   Mild tricuspid regurgitation. Telemetry9/23/2020: Atrial flutter with variable AV block     ECG 9/20/2020: Atrial flutter with variable block, nonspecific ST-T wave changes, ventricular rate 117    I have independently reviewed all of the ECGs and rhythm strips per above. I have personally reviewed the laboratory, cardiac diagnostic and radiographic testing as outlined above. I have reviewed previous records noted in [address]. Impression:  1. Typical Atrial Flutter sp DCCV x 2 (1/25/17, 12/2019, and 9/21/20)  -CHADSVASC = 1 (HF). Continue Xarelto 20 mg daily.   -Initially diagnosed in 2016.  -He has a history of TCM. Recommend rhythm control strategy. Due to history of noncompliance and polysubstance abuse, rhythm control limited to intermittent DCCV. Patient now lives in a group home and denies substance use in > 3 years, so will consider ablation in the future. -Remains in sinus rhythm since DCCV on 09/21/2020.   -Continue Toprol 50mg BID. 2. NYHA Class II HFpEF-improved/nonischemic 2/2 AFL (TTE 9/22/20: LVEF = 55%)   -Continue Toprol 50 mg every 12 hours and Lisinopril 2.5 mg daily. 3. History of Schizoaffective Disorder     4. Medical noncompliance     Recommendations:    1. Tera [facility] for discharge from EP perspective. 2. Continue Toprol/Eliquis  3. Will need EKG in one week and follow up with a provider in one month. Thank you for allowing me to participate in your patient's care.     Cindy Huerta DO  [facility] Cardiac Electrophysiology  [address] [facility]

## 2020-09-28 ENCOUNTER — TELEPHONE (OUTPATIENT)
Dept: ADMINISTRATIVE | Age: 60
End: 2020-09-28

## 2020-09-28 NOTE — TELEPHONE ENCOUNTER
Madison Mnacini called to RS EKG that was scheduled for today, appt conflict, please call Amanuel Romero back at [phone] to ES

## 2020-10-01 ENCOUNTER — NURSE ONLY (OUTPATIENT)
Dept: NON INVASIVE DIAGNOSTICS | Age: 60
End: 2020-10-01
Payer: COMMERCIAL

## 2020-10-01 VITALS — TEMPERATURE: 97.3 F

## 2020-10-01 PROCEDURE — 93000 ELECTROCARDIOGRAM COMPLETE: CPT | Performed by: INTERNAL MEDICINE

## 2020-10-02 ENCOUNTER — TELEPHONE (OUTPATIENT)
Dept: NON INVASIVE DIAGNOSTICS | Age: 60
End: 2020-10-02

## 2020-10-02 RX ORDER — METOPROLOL SUCCINATE 50 MG/1
75 TABLET, EXTENDED RELEASE ORAL 2 TIMES DAILY
Qty: 90 TABLET | Refills: 3 | Status: SHIPPED
Start: 2020-10-02 | End: 2020-10-20

## 2020-10-02 NOTE — TELEPHONE ENCOUNTER
Spoke with Pau Archer () and she verbalized understanding. Needs new script sent to [facility].

## 2020-10-02 NOTE — TELEPHONE ENCOUNTER
----- Message from Jg Quinn MD sent at 10/2/2020 10:20 AM EDT -----  He is maintaining sinus rhythm.   Increase metoprolol to 75 mg twice daily  ----- Message -----  From: Mihaela Apodaca MA  Sent: 10/1/2020   1:11 PM EDT  To: Jg Quinn MD

## 2020-10-20 ENCOUNTER — OFFICE VISIT (OUTPATIENT)
Dept: NON INVASIVE DIAGNOSTICS | Age: 60
End: 2020-10-20
Payer: COMMERCIAL

## 2020-10-20 VITALS
WEIGHT: 207 LBS | RESPIRATION RATE: 18 BRPM | OXYGEN SATURATION: 99 % | HEART RATE: 102 BPM | DIASTOLIC BLOOD PRESSURE: 78 MMHG | HEIGHT: 74 IN | BODY MASS INDEX: 26.56 KG/M2 | SYSTOLIC BLOOD PRESSURE: 110 MMHG

## 2020-10-20 PROCEDURE — 93000 ELECTROCARDIOGRAM COMPLETE: CPT | Performed by: INTERNAL MEDICINE

## 2020-10-20 PROCEDURE — 99214 OFFICE O/P EST MOD 30 MIN: CPT | Performed by: INTERNAL MEDICINE

## 2020-10-20 RX ORDER — METOPROLOL SUCCINATE 100 MG/1
100 TABLET, EXTENDED RELEASE ORAL 2 TIMES DAILY
COMMUNITY
Start: 2020-09-22

## 2020-10-20 RX ORDER — ERGOCALCIFEROL 1.25 MG/1
50000 CAPSULE ORAL WEEKLY
COMMUNITY

## 2020-10-20 RX ORDER — DILTIAZEM HYDROCHLORIDE 120 MG/1
120 CAPSULE, COATED, EXTENDED RELEASE ORAL DAILY
Qty: 30 CAPSULE | Refills: 3 | Status: SHIPPED
Start: 2020-10-20 | End: 2021-01-07

## 2020-10-20 ASSESSMENT — ENCOUNTER SYMPTOMS
SINUS PRESSURE: 0
DIARRHEA: 0
NAUSEA: 0
ABDOMINAL PAIN: 0
CHEST TIGHTNESS: 0
COUGH: 0
ABDOMINAL DISTENTION: 0
WHEEZING: 0
COLOR CHANGE: 0
SHORTNESS OF BREATH: 0
EYE REDNESS: 0

## 2020-10-20 NOTE — PROGRESS NOTES
Cardiac Electrophysiology Outpatient Progress Note    Subhash [place]  1960  Date of Service: 10/20/2020  Referring Provider/PCP: No primary care provider on file. Chief Complaint: AFL    HISTORY OF PRESENT ILLNESS    Subhash [place] presents to the office today for follow up of these Electrophysiology conditions: AFL. He was diagnosed with new onset atrial flutter in November 2016 in the setting setting of alcohol abuse, decompensated heart failure, LVEF 30% at that time.    He was cardioverted on 11/16/16 and placed on metoprolol 50 mg twice daily. 1/25/2017: Seen by Dr. Norma Carmona with electrophysiology with shortness of breath palpitations. He had also had a LifeVest shock secondary to his arrhythmia, patient was noncompliant with medications. Repeat LVEF was 55% so LifeVest was discontinued. Noncompliance with medications was again noted     12/2019: Hospitalized for atrial flutter. KADEEM showed severe biventricular  Systolic dysfunction. He had cardioversion and was again discharged on 50 mg Toprol-XL twice daily.     9/19/20 : Seen in the emergency room for dizziness, lightheadedness and unsteadiness. He was found to be in rapid atrial flutter. He received lidocaine in the emergency room; diltiazem drip was started subsequently. He had KADEEM/DCCV 9/21/20.    10/20/20: Mr. Casie White presents for follow and reports feeling overall well. He currently lives at [facility]. He remains in NSR and continues on Xarelto for stroke reduction. The patient denies any chest pain, dyspnea, palpitations, dizziness, syncope, orthopnea or paroxysmal nocturnal dyspnea. He wishes to continue on medication and wishes to defer AAD therapy and catheter ablation at this time.        Patient Active Problem List    Diagnosis Date Noted    Cardiomyopathy [facility])     Typical [facility] flutter [facility]     SVT (supraventricular tachycardia) ([facility] 09/19/2020    Chronic systolic heart failure (HCC)     Schizoaffective disorder, chronic condition ([facility]        Family History   Problem Relation Age of Onset    Other Mother     Kidney Disease Father     Other Father        SOCIAL HISTORY   Social History     Socioeconomic History    Marital status: Single     Spouse name: Not on file    Number of children: Not on file    Years of education: Not on file    Highest education level: Not on file   Occupational History    Not on file   Social Needs    Financial resource strain: Not on file    Food insecurity     Worry: Not on file     Inability: Not on file    Transportation needs     Medical: Not on file     Non-medical: Not on file   Tobacco Use    Smoking status: Former Smoker     Types: Cigars     Last attempt to quit: 2019     Years since quittin.8    Smokeless tobacco: Never Used    Tobacco comment: about 3 cigars a month   Substance and Sexual Activity    Alcohol use: Not Currently     Comment: havs not drank in 2yrs    Drug use: No    Sexual activity: Not on file   Lifestyle    Physical activity     Days per week: Not on file     Minutes per session: Not on file    Stress: Not on file   Relationships    Social connections     Talks on phone: Not on file     Gets together: Not on file     Attends [employer] service: Not on file     Active member of club or organization: Not on file     Attends meetings of clubs or organizations: Not on file     Relationship status: Not on file    Intimate partner violence     Fear of current or ex partner: Not on file     Emotionally abused: Not on file     Physically abused: Not on file     Forced sexual activity: Not on file   Other Topics Concern    Not on file   Social History Narrative    Not on file        History reviewed. No pertinent surgical history.     Current Outpatient Medications   Medication Sig Dispense Refill    vitamin D (ERGOCALCIFEROL) 1.25 MG (11192 UT) CAPS capsule Take 50,000 Units by mouth once a week      metoprolol succinate (TOPROL XL) 100 MG extended release tablet Take 100 mg by mouth 2 times daily 1/2 tablet twice daily      dilTIAZem (CARDIZEM CD) 120 MG extended release capsule Take 1 capsule by mouth daily 30 capsule 3    Multiple Vitamins-Minerals (CERTAVITE SENIOR/ANTIOXIDANT) TABS Take 1 tablet by mouth every morning      rivaroxaban (XARELTO) 20 MG TABS tablet Take 1 tablet by mouth Daily with supper 30 tablet 5    lisinopril (PRINIVIL;ZESTRIL) 2.5 MG tablet Take 1 tablet by mouth daily 30 tablet 3    QUEtiapine (SEROQUEL) 50 MG tablet Take 50 mg by mouth nightly      paliperidone (INVEGA) 6 MG extended release tablet Take 6 mg by mouth every morning      ibuprofen (ADVIL;MOTRIN) 800 MG tablet Take 800 mg by mouth every 8 hours as needed for Pain       No current facility-administered medications for this visit. No Known Allergies        ROS:   Review of Systems   Constitutional: Positive for fatigue. Negative for fever. HENT: Negative for congestion, nosebleeds and sinus pressure. Eyes: Negative for redness and visual disturbance. Respiratory: Negative for cough, chest tightness, shortness of breath and wheezing. Cardiovascular: Negative for chest pain, palpitations and leg swelling. Gastrointestinal: Negative for abdominal distention, abdominal pain, diarrhea and nausea. Endocrine: Negative for cold intolerance, heat intolerance, polydipsia and polyphagia. Genitourinary: Negative for difficulty urinating, frequency and urgency. Musculoskeletal: Negative for arthralgias, joint swelling and myalgias. Skin: Negative for color change and wound. Neurological: Negative for dizziness, syncope, weakness and numbness. Psychiatric/Behavioral: Negative for agitation, behavioral problems, confusion, decreased concentration, hallucinations and suicidal ideas. The patient is not nervous/anxious.             PHYSICAL EXAM:  Vitals:    10/20/20 1402   BP: 110/78   Site: Left Upper Arm   Position: Sitting   Cuff Size: 2017 Findings: The lungs are symmetrically expanded, and are clear. There is no evidence of pneumothorax or pleural effusion. Cardiovascular shadows are normal in appearance. There is no evidence of cardiac enlargement or decompensation. Skeletal structures show no evidence of acute pathology. Overlying EKG leads are present.      No acute cardiopulmonary pathology. TTE: 9/22/2020:  Summary   Normal left ventricular size. LV systolic function is low normal.   Ejection fraction is visually estimated at 50-55%. Indeterminate diastolic function. No regional wall motion abnormalities seen. Normal left ventricular wall thickness. Normal right ventricular size and function. The left atrium is mildly dilated. No significant valvular abnormalities. ECG 10/20/2020: sinus tachycardia, rate: 102 bpm - see scanned cardiology    I have independently reviewed all of the ECGs and rhythm strips per above    I have personally reviewed the laboratory, cardiac diagnostic and radiographic testing as outlined above: We have requested previous records. 1. Atrial flutter, unspecified type ([address]    2. SVT (supraventricular tachycardia) ([address]    3. Dilated cardiomyopathy ([address]    4. Schizoaffective disorder, chronic condition ([address]    5. Typical atrial flutter ([address]    6. Fatigue, unspecified type    7. Sinus tachycardia         Assessment & Plan  1. Typical Atrial Flutter sp DCCV x 2 (1/25/17, 12/2019, and 9/21/20)  -CHADSVASC = 1 (HF). Continue Xarelto 20 mg daily.   -Initially diagnosed in 2016.  -He has a history of TCM. Recommend rhythm control strategy. Due to history of noncompliance and polysubstance abuse, rhythm control limited to intermittent DCCV. Patient now lives in a group home and denies substance use in > 3 years  - we discussed rhythm control today and he is not interested in coming to hospital for drug load or invasive procedures. He would rather stay on rate control currently.   -Remains in sinus rhythm since [facility] on 09/21/2020.   -Continue Toprol 100mg BID.  - HR today 110 thus add Cardizem for additional rate control. Hopefully, we can titrate up and wean toprol to avoid symptoms of fatigue with BB  - Re-education on importance of well controlled HTN (goal BP < 130/80), adequate weight control (goal BMI of < 27), physical activity consisting of moderate cardiopulmonary exercise up to a goal of 250 min/wk, daily compliance with CPAP in treating sleep apnea, smoking cessation and limited ETOH intake.       2. NYHA Class II HFpEF-improved/nonischemic 2/2 AFL (TTE 9/22/20: LVEF = 55%)   -Continue Toprol 50 mg every 12 hours and Lisinopril 2.5 mg daily.     3. History of Schizoaffective Disorder      4. Medical noncompliance     Plan:   1. Start Cardizem 120mg daily and EKG in 2 weeks for adjustment. 2. Lifestyle modifications as outline above. 3. Follow up in 3 months. Thank you for allowing me to participate in your patient's care.     Edita Has MD  Cardiac Electrophysiology  [address] [place] [address]

## 2020-12-07 RX ORDER — METOPROLOL SUCCINATE 50 MG/1
TABLET, EXTENDED RELEASE ORAL
Qty: 90 TABLET | Refills: 11 | Status: SHIPPED | OUTPATIENT
Start: 2020-12-07

## 2021-01-07 DIAGNOSIS — I47.1 SVT (SUPRAVENTRICULAR TACHYCARDIA) (HCC): ICD-10-CM

## 2021-01-07 RX ORDER — DILTIAZEM HYDROCHLORIDE 120 MG/1
CAPSULE, COATED, EXTENDED RELEASE ORAL
Qty: 30 CAPSULE | Refills: 11 | Status: SHIPPED | OUTPATIENT
Start: 2021-01-07